# Patient Record
Sex: FEMALE | Race: WHITE | NOT HISPANIC OR LATINO | Employment: FULL TIME | URBAN - NONMETROPOLITAN AREA
[De-identification: names, ages, dates, MRNs, and addresses within clinical notes are randomized per-mention and may not be internally consistent; named-entity substitution may affect disease eponyms.]

---

## 2018-06-18 ENCOUNTER — OFFICE VISIT (OUTPATIENT)
Dept: URGENT CARE | Facility: PHYSICIAN GROUP | Age: 48
End: 2018-06-18
Payer: COMMERCIAL

## 2018-06-18 VITALS
SYSTOLIC BLOOD PRESSURE: 110 MMHG | DIASTOLIC BLOOD PRESSURE: 84 MMHG | HEIGHT: 67 IN | BODY MASS INDEX: 27.62 KG/M2 | TEMPERATURE: 97 F | HEART RATE: 80 BPM | WEIGHT: 176 LBS | RESPIRATION RATE: 14 BRPM | OXYGEN SATURATION: 95 %

## 2018-06-18 DIAGNOSIS — J06.9 URI WITH COUGH AND CONGESTION: ICD-10-CM

## 2018-06-18 DIAGNOSIS — R06.02 SOB (SHORTNESS OF BREATH): ICD-10-CM

## 2018-06-18 DIAGNOSIS — R06.2 WHEEZING: ICD-10-CM

## 2018-06-18 PROCEDURE — 99214 OFFICE O/P EST MOD 30 MIN: CPT | Performed by: PHYSICIAN ASSISTANT

## 2018-06-18 RX ORDER — METHYLPREDNISOLONE 4 MG/1
TABLET ORAL
Qty: 21 TAB | Refills: 0 | Status: SHIPPED | OUTPATIENT
Start: 2018-06-18 | End: 2019-02-25

## 2018-06-18 RX ORDER — DOXYCYCLINE HYCLATE 100 MG
100 TABLET ORAL 2 TIMES DAILY
Qty: 14 TAB | Refills: 0 | Status: SHIPPED | OUTPATIENT
Start: 2018-06-18 | End: 2019-02-25

## 2018-06-19 NOTE — PROGRESS NOTES
"  Chief Complaint   Patient presents with   • Cough     Persistent/Painful cough x2w       HISTORY OF PRESENT ILLNESS: Patient is a 48 y.o. female who presents today for the following:    Cough x 2 weeks  CP, worse with coughing, sneezing, breathing  H/o the same with \"lung infection\"  + mostly dry cough, low grade fever, SOB, ST (but now resolved)  OTC meds tried: Nyquil, mucinex      There are no active problems to display for this patient.      Allergies:Patient has no known allergies.    Current Outpatient Prescriptions Ordered in [facility]   Medication Sig Dispense Refill   • doxycycline (VIBRAMYCIN) 100 MG Tab Take 1 Tab by mouth 2 times a day. 14 Tab 0   • MethylPREDNISolone (MEDROL DOSEPAK) 4 MG Tablet Therapy Pack Use as package directs 21 Tab 0     No current Epic-ordered facility-administered medications on file.        Past Medical History:   Diagnosis Date   • Pneumonia        Social History   Substance Use Topics   • Smoking status: Current Every Day Smoker     Types: Cigarettes     Last attempt to quit: 1/1/2014   • Smokeless tobacco: Never Used   • Alcohol use No       Family Status   Relation Status   • Mother    • Father      Family History   Problem Relation Age of Onset   • Non-contributory Mother    • Non-contributory Father        Review of Systems:   Constitutional ROS: No unexpected change in weight, No weakness, No fatigue  Eye ROS: No recent significant change in vision, No eye pain, redness, discharge  Ear ROS: No drainage, No tinnitus or vertigo, No recent change in hearing  Mouth/Throat ROS: No teeth or gum problems, No bleeding gums, No tongue complaints  Neck ROS: No swollen glands, No significant pain in neck  Cardiovascular ROS: No diaphoresis, No edema, No palpitations  Gastrointestinal ROS: No change in bowel habits, No significant change in appetite, No nausea, vomiting, diarrhea, or constipation  Musculoskeletal/Extremities ROS: No peripheral edema, No pain, redness or swelling on the " "joints  Hematologic/Lymphatic ROS: No chills, No night sweats, No weight loss  Skin/Integumentary ROS: No edema, No evidence of rash, No itching      Exam:  Blood pressure 110/84, pulse 80, temperature 36.1 °C (97 °F), resp. rate 14, height 1.702 m (5' 7\"), weight 79.8 kg (176 lb), SpO2 95 %.  General: Well developed, well nourished. No distress.  Eye: PERRL/EOMI; conjunctivae clear, lids normal.  ENMT: Lips without lesions, MMM. Oropharynx is clear. Bilateral TMs are within normal limits.  Neck: Trachea midline, no masses. No thyromegaly.  Pulmonary: Unlabored respiratory effort. Diffuse fine end expiratory wheezes.  Cardiovascular: Regular rate and rhythm without murmur. No edema.   Neurologic: Grossly nonfocal. No facial asymmetry noted.  Lymph: No cervical lymphadenopathy noted.  Skin: Warm, dry, good turgor. No rashes in visible areas.   Psych: Normal mood. Alert and oriented x3. Judgment and insight is normal.    Assessment/Plan:  Take all medications as directed. Follow up for worsening or persistent symptoms.. Discussed appropriate over-the-counter symptomatic medication, and when to return to clinic.  1. URI with cough and congestion  doxycycline (VIBRAMYCIN) 100 MG Tab   2. Wheezing  MethylPREDNISolone (MEDROL DOSEPAK) 4 MG Tablet Therapy Pack   3. SOB (shortness of breath)  MethylPREDNISolone (MEDROL DOSEPAK) 4 MG Tablet Therapy Pack       "

## 2018-10-17 ENCOUNTER — NON-PROVIDER VISIT (OUTPATIENT)
Dept: URGENT CARE | Facility: PHYSICIAN GROUP | Age: 48
End: 2018-10-17

## 2018-10-17 DIAGNOSIS — Z02.1 PRE-EMPLOYMENT DRUG SCREENING: ICD-10-CM

## 2018-10-17 LAB
AMP AMPHETAMINE: NORMAL
COC COCAINE: NORMAL
INT CON NEG: NORMAL
INT CON POS: NORMAL
MET METHAMPHETAMINES: NORMAL
OPI OPIATES: NORMAL
PCP PHENCYCLIDINE: NORMAL
POC DRUG COMMENT 753798-OCCUPATIONAL HEALTH: NEGATIVE
THC: NORMAL

## 2018-10-17 PROCEDURE — 80305 DRUG TEST PRSMV DIR OPT OBS: CPT | Performed by: NURSE PRACTITIONER

## 2018-12-10 ENCOUNTER — TELEPHONE (OUTPATIENT)
Dept: SCHEDULING | Facility: IMAGING CENTER | Age: 48
End: 2018-12-10

## 2019-01-07 ENCOUNTER — OFFICE VISIT (OUTPATIENT)
Dept: MEDICAL GROUP | Facility: PHYSICIAN GROUP | Age: 49
End: 2019-01-07
Payer: COMMERCIAL

## 2019-01-07 VITALS
TEMPERATURE: 97.6 F | HEIGHT: 67 IN | DIASTOLIC BLOOD PRESSURE: 80 MMHG | HEART RATE: 82 BPM | SYSTOLIC BLOOD PRESSURE: 118 MMHG | BODY MASS INDEX: 28.41 KG/M2 | RESPIRATION RATE: 16 BRPM | WEIGHT: 181 LBS | OXYGEN SATURATION: 99 %

## 2019-01-07 DIAGNOSIS — Z13.6 SCREENING FOR CARDIOVASCULAR CONDITION: ICD-10-CM

## 2019-01-07 DIAGNOSIS — Z12.39 SCREENING FOR BREAST CANCER: ICD-10-CM

## 2019-01-07 DIAGNOSIS — Z13.0 ENCOUNTER FOR SCREENING FOR HEMATOLOGIC DISORDER: ICD-10-CM

## 2019-01-07 DIAGNOSIS — M25.50 ARTHRALGIA, UNSPECIFIED JOINT: ICD-10-CM

## 2019-01-07 DIAGNOSIS — F33.9 RECURRENT MAJOR DEPRESSIVE DISORDER, REMISSION STATUS UNSPECIFIED (HCC): ICD-10-CM

## 2019-01-07 PROBLEM — F17.200 NICOTINE DEPENDENCE WITH CURRENT USE: Status: ACTIVE | Noted: 2019-01-07

## 2019-01-07 PROCEDURE — 99214 OFFICE O/P EST MOD 30 MIN: CPT | Performed by: NURSE PRACTITIONER

## 2019-01-07 RX ORDER — VENLAFAXINE HYDROCHLORIDE 37.5 MG/1
37.5 CAPSULE, EXTENDED RELEASE ORAL DAILY
Qty: 90 CAP | Refills: 1 | Status: SHIPPED | OUTPATIENT
Start: 2019-01-07 | End: 2019-02-25

## 2019-01-07 RX ORDER — SERTRALINE HYDROCHLORIDE 100 MG/1
TABLET, FILM COATED ORAL
Refills: 3 | COMMUNITY
Start: 2018-11-07 | End: 2019-01-07

## 2019-01-07 RX ORDER — NAPROXEN 500 MG/1
500 TABLET ORAL 2 TIMES DAILY WITH MEALS
Qty: 60 TAB | Refills: 1 | Status: SHIPPED | OUTPATIENT
Start: 2019-01-07 | End: 2020-01-15

## 2019-01-07 ASSESSMENT — PATIENT HEALTH QUESTIONNAIRE - PHQ9
CLINICAL INTERPRETATION OF PHQ2 SCORE: 6
5. POOR APPETITE OR OVEREATING: 3 - NEARLY EVERY DAY
SUM OF ALL RESPONSES TO PHQ QUESTIONS 1-9: 18

## 2019-01-08 PROBLEM — M25.50 ARTHRALGIA: Status: ACTIVE | Noted: 2019-01-08

## 2019-01-08 NOTE — ASSESSMENT & PLAN NOTE
This is a chronic condition, stable and previously controlled on sertraline 100 mg daily.  However she has been out of her medications for several months as there was issue with getting set up with a new PCP at her previous medical organization.  She does state that she would like to try a different medication as she did not feel the sertraline was effective anymore.  She is very hesitant to try anything that will cause weight gain and she is adamant about not starting Wellbutrin due to side effects.  She is agreeable to starting the venlafaxine extended release, 37.5 mg daily.  I did discuss with her that there is room to move up if needed.  She does decline a referral to behavioral health, states she has seen therapy in the past and did not find it to be helpful.  She does deny suicidal and homicidal ideations, hallucinations, racing thoughts and flights of ideas.  She will follow-up in 6-8 weeks with labs done before visit.

## 2019-01-08 NOTE — PATIENT INSTRUCTIONS
Mammogram ordered    Labs before you see me in 6-8 weeks    Will start Effexor XR 37.5 mg daily

## 2019-01-08 NOTE — ASSESSMENT & PLAN NOTE
Patient reports that since starting her new job a couple months ago she has had persistent bilateral lower extremity joint pain especially in her feet, reports to being on her feet nearly her entire shift, she is tried several different shoes and nothing has improved her pain.  She has not tried anything over-the-counter, we will have her start naproxen 500 mg twice daily with food.  She is aware of proper exercises for her bilateral foot and ankle pain.  Discussed with her that she may need referral to podiatry if pain does not improve with interventions and anti-inflammatories.

## 2019-01-08 NOTE — PROGRESS NOTES
Chief Complaint   Patient presents with   • Annual Exam     est care, depression         This is a 48 y.o.female patient that presents today with the following: Establish care with new PCP, discuss acute and chronic conditions    Recurrent major depressive disorder (HCC)  This is a chronic condition, stable and previously controlled on sertraline 100 mg daily.  However she has been out of her medications for several months as there was issue with getting set up with a new PCP at her previous medical organization.  She does state that she would like to try a different medication as she did not feel the sertraline was effective anymore.  She is very hesitant to try anything that will cause weight gain and she is adamant about not starting Wellbutrin due to side effects.  She is agreeable to starting the venlafaxine extended release, 37.5 mg daily.  I did discuss with her that there is room to move up if needed.  She does decline a referral to behavioral health, states she has seen therapy in the past and did not find it to be helpful.  She does deny suicidal and homicidal ideations, hallucinations, racing thoughts and flights of ideas.  She will follow-up in 6-8 weeks with labs done before visit.    Arthralgia  Patient reports that since starting her new job a couple months ago she has had persistent bilateral lower extremity joint pain especially in her feet, reports to being on her feet nearly her entire shift, she is tried several different shoes and nothing has improved her pain.  She has not tried anything over-the-counter, we will have her start naproxen 500 mg twice daily with food.  She is aware of proper exercises for her bilateral foot and ankle pain.  Discussed with her that she may need referral to podiatry if pain does not improve with interventions and anti-inflammatories.      No visits with results within 1 Month(s) from this visit.   Latest known visit with results is:   Non-Provider Visit on  10/17/2018   Component Date Value   • POC Urine Drug Screen Co* 10/17/2018 Negative    • Internal Control Positive 10/17/2018 Valid    • Internal Control Negative 10/17/2018 Valid          clinical course has been stable    Past Medical History:   Diagnosis Date   • Allergy    • Anxiety    • Cancer (HCC)    • Depression    • Migraine    • Pneumonia        Past Surgical History:   Procedure Laterality Date   • TONSILLECTOMY AND ADENOIDECTOMY     • TUBAL COAGULATION LAPAROSCOPIC BILATERAL         Family History   Problem Relation Age of Onset   • Non-contributory Mother    • Hypertension Mother    • Non-contributory Father    • Arthritis Father    • Hyperlipidemia Father    • Cancer Maternal Grandmother    • Psychiatry Maternal Grandmother    • Diabetes Paternal Grandfather    • Stroke Maternal Grandfather        Patient has no known allergies.    Current Outpatient Prescriptions Ordered in [facility]   Medication Sig Dispense Refill   • venlafaxine XR (EFFEXOR XR) 37.5 MG CAPSULE SR 24 HR Take 1 Cap by mouth every day. 90 Cap 1   • naproxen (NAPROSYN) 500 MG Tab Take 1 Tab by mouth 2 times a day, with meals. 60 Tab 1   • doxycycline (VIBRAMYCIN) 100 MG Tab Take 1 Tab by mouth 2 times a day. 14 Tab 0   • MethylPREDNISolone (MEDROL DOSEPAK) 4 MG Tablet Therapy Pack Use as package directs 21 Tab 0     No current Epic-ordered facility-administered medications on file.        Constitutional ROS: No unexpected change in weight, No weakness, No unexplained fevers, sweats, or chills  Pulmonary ROS: No chronic cough, sputum, or hemoptysis, No shortness of breath, No recent change in breathing, Positive for smoker   Cardiovascular ROS: No chest pain, No edema, No palpitations  Gastrointestinal ROS: No abdominal pain, No nausea, vomiting, diarrhea, or constipation  Musculoskeletal/Extremities ROS: Positive per HPI  Neurologic ROS: Normal development, No seizures, No weakness  Psychiatric ROS: Positive per HPI    Physical exam:  BP  "118/80 (BP Location: Right arm, Patient Position: Sitting, BP Cuff Size: Adult long)   Pulse 82   Temp 36.4 °C (97.6 °F) (Temporal)   Resp 16   Ht 1.689 m (5' 6.5\")   Wt 82.1 kg (181 lb)   SpO2 99%   BMI 28.78 kg/m²   General Appearance: Middle-aged female, alert, no distress, moderately overweight, well-groomed  Skin: Skin color, texture, turgor normal. No rashes or lesions.  Lungs: negative findings: normal respiratory rate and rhythm, lungs clear to auscultation  Heart: negative. RRR without murmur, gallop, or rubs.  No ectopy.  Abdomen: Abdomen soft, non-tender. BS normal. No masses,  No organomegaly  Musculoskeletal: negative findings: no evidence of joint instability, strength normal, no deformities present  Neurologic: intact, intact, mood appropriate, judgment intact.  CN II through XII grossly intact    Medical decision making/discussion: Patient will follow-up in 6-8 weeks with labs done before visit.  She will start venlafaxine ER 37.5 mg daily, she declines referral to behavioral health.  For her lower extremity joint pain, she will start naproxen 500 mg twice daily with food.  Discussed with her that we may need to consider referral to podiatry if her symptoms not improve with anti-inflammatories, gentle range of motion exercises and stretches.  We will get outside records from [facility] and mammogram has been ordered for screening.    Caro was seen today for annual exam.    Diagnoses and all orders for this visit:    Recurrent major depressive disorder, remission status unspecified (HCC)  -     venlafaxine XR (EFFEXOR XR) 37.5 MG CAPSULE SR 24 HR; Take 1 Cap by mouth every day.  -     TSH WITH REFLEX TO FT4; Future    Arthralgia, unspecified joint  -     naproxen (NAPROSYN) 500 MG Tab; Take 1 Tab by mouth 2 times a day, with meals.    Screening for cardiovascular condition  -     COMP METABOLIC PANEL; Future  -     Lipid Profile; Future    Screening for breast cancer  -  "    MA-SCREEN MAMMO W/CAD-BILAT; Future    Encounter for screening for hematologic disorder  -     CBC WITH DIFFERENTIAL; Future    Other orders  -     Obtain Results: Other (see comment); Obtain Results From: [facility]          Please note that this dictation was created using voice recognition software. I have made every reasonable attempt to correct obvious errors, but I expect that there are errors of grammar and possibly content that I did not discover before finalizing the note.

## 2019-01-26 ENCOUNTER — HOSPITAL ENCOUNTER (OUTPATIENT)
Dept: LAB | Facility: MEDICAL CENTER | Age: 49
End: 2019-01-26
Attending: NURSE PRACTITIONER
Payer: COMMERCIAL

## 2019-01-26 DIAGNOSIS — Z13.6 SCREENING FOR CARDIOVASCULAR CONDITION: ICD-10-CM

## 2019-01-26 DIAGNOSIS — Z13.0 ENCOUNTER FOR SCREENING FOR HEMATOLOGIC DISORDER: ICD-10-CM

## 2019-01-26 DIAGNOSIS — F33.9 RECURRENT MAJOR DEPRESSIVE DISORDER, REMISSION STATUS UNSPECIFIED (HCC): ICD-10-CM

## 2019-01-26 LAB
ALBUMIN SERPL BCP-MCNC: 4.2 G/DL (ref 3.2–4.9)
ALBUMIN/GLOB SERPL: 1.6 G/DL
ALP SERPL-CCNC: 86 U/L (ref 30–99)
ALT SERPL-CCNC: 30 U/L (ref 2–50)
ANION GAP SERPL CALC-SCNC: 7 MMOL/L (ref 0–11.9)
AST SERPL-CCNC: 29 U/L (ref 12–45)
BASOPHILS # BLD AUTO: 0.9 % (ref 0–1.8)
BASOPHILS # BLD: 0.08 K/UL (ref 0–0.12)
BILIRUB SERPL-MCNC: 0.8 MG/DL (ref 0.1–1.5)
BUN SERPL-MCNC: 19 MG/DL (ref 8–22)
CALCIUM SERPL-MCNC: 9.1 MG/DL (ref 8.5–10.5)
CHLORIDE SERPL-SCNC: 109 MMOL/L (ref 96–112)
CHOLEST SERPL-MCNC: 165 MG/DL (ref 100–199)
CO2 SERPL-SCNC: 25 MMOL/L (ref 20–33)
CREAT SERPL-MCNC: 0.9 MG/DL (ref 0.5–1.4)
EOSINOPHIL # BLD AUTO: 0.25 K/UL (ref 0–0.51)
EOSINOPHIL NFR BLD: 2.7 % (ref 0–6.9)
ERYTHROCYTE [DISTWIDTH] IN BLOOD BY AUTOMATED COUNT: 46.7 FL (ref 35.9–50)
GLOBULIN SER CALC-MCNC: 2.7 G/DL (ref 1.9–3.5)
GLUCOSE SERPL-MCNC: 91 MG/DL (ref 65–99)
HCT VFR BLD AUTO: 46.1 % (ref 37–47)
HDLC SERPL-MCNC: 51 MG/DL
HGB BLD-MCNC: 15.5 G/DL (ref 12–16)
IMM GRANULOCYTES # BLD AUTO: 0.02 K/UL (ref 0–0.11)
IMM GRANULOCYTES NFR BLD AUTO: 0.2 % (ref 0–0.9)
LDLC SERPL CALC-MCNC: 101 MG/DL
LYMPHOCYTES # BLD AUTO: 3.01 K/UL (ref 1–4.8)
LYMPHOCYTES NFR BLD: 32.6 % (ref 22–41)
MCH RBC QN AUTO: 32.4 PG (ref 27–33)
MCHC RBC AUTO-ENTMCNC: 33.6 G/DL (ref 33.6–35)
MCV RBC AUTO: 96.4 FL (ref 81.4–97.8)
MONOCYTES # BLD AUTO: 0.71 K/UL (ref 0–0.85)
MONOCYTES NFR BLD AUTO: 7.7 % (ref 0–13.4)
NEUTROPHILS # BLD AUTO: 5.15 K/UL (ref 2–7.15)
NEUTROPHILS NFR BLD: 55.9 % (ref 44–72)
NRBC # BLD AUTO: 0 K/UL
NRBC BLD-RTO: 0 /100 WBC
PLATELET # BLD AUTO: 288 K/UL (ref 164–446)
PMV BLD AUTO: 11 FL (ref 9–12.9)
POTASSIUM SERPL-SCNC: 3.9 MMOL/L (ref 3.6–5.5)
PROT SERPL-MCNC: 6.9 G/DL (ref 6–8.2)
RBC # BLD AUTO: 4.78 M/UL (ref 4.2–5.4)
SODIUM SERPL-SCNC: 141 MMOL/L (ref 135–145)
T4 FREE SERPL-MCNC: 0.67 NG/DL (ref 0.53–1.43)
TRIGL SERPL-MCNC: 65 MG/DL (ref 0–149)
TSH SERPL DL<=0.005 MIU/L-ACNC: 7.73 UIU/ML (ref 0.38–5.33)
WBC # BLD AUTO: 9.2 K/UL (ref 4.8–10.8)

## 2019-01-26 PROCEDURE — 36415 COLL VENOUS BLD VENIPUNCTURE: CPT

## 2019-01-26 PROCEDURE — 84439 ASSAY OF FREE THYROXINE: CPT

## 2019-01-26 PROCEDURE — 80053 COMPREHEN METABOLIC PANEL: CPT

## 2019-01-26 PROCEDURE — 85025 COMPLETE CBC W/AUTO DIFF WBC: CPT

## 2019-01-26 PROCEDURE — 84443 ASSAY THYROID STIM HORMONE: CPT

## 2019-01-26 PROCEDURE — 80061 LIPID PANEL: CPT

## 2019-02-05 ENCOUNTER — TELEPHONE (OUTPATIENT)
Dept: MEDICAL GROUP | Facility: PHYSICIAN GROUP | Age: 49
End: 2019-02-05

## 2019-02-05 NOTE — TELEPHONE ENCOUNTER
----- Message from Caro Harding sent at 2/4/2019  5:00 AM PST -----  Regarding: Lab results  Caro,  I have reviewed your labs, all are good except TSH (thyroid stimulating hormone) was mildly elevated but the actually thyroid hormone level was normal. We will closely watch this. We can discuss more at your follow up appt at the end of February.  Jaleesa

## 2019-02-05 NOTE — TELEPHONE ENCOUNTER
I sent the below info to patient via a [employer] message a week ago--pt still has not read message. Please call pt with message. Thank you.

## 2019-02-06 NOTE — TELEPHONE ENCOUNTER
Phone Number Called: [phone] (home)     Message: Advised patient to review labs on [facility] or call back for results.     Left Message for patient to call back: yes

## 2019-02-25 ENCOUNTER — OFFICE VISIT (OUTPATIENT)
Dept: MEDICAL GROUP | Facility: PHYSICIAN GROUP | Age: 49
End: 2019-02-25
Payer: COMMERCIAL

## 2019-02-25 VITALS
WEIGHT: 179 LBS | SYSTOLIC BLOOD PRESSURE: 122 MMHG | DIASTOLIC BLOOD PRESSURE: 78 MMHG | RESPIRATION RATE: 16 BRPM | HEART RATE: 70 BPM | HEIGHT: 67 IN | OXYGEN SATURATION: 97 % | TEMPERATURE: 97.4 F | BODY MASS INDEX: 28.09 KG/M2

## 2019-02-25 DIAGNOSIS — F33.9 RECURRENT MAJOR DEPRESSIVE DISORDER, REMISSION STATUS UNSPECIFIED (HCC): ICD-10-CM

## 2019-02-25 DIAGNOSIS — R79.89 ABNORMAL TSH: ICD-10-CM

## 2019-02-25 PROCEDURE — 99214 OFFICE O/P EST MOD 30 MIN: CPT | Performed by: NURSE PRACTITIONER

## 2019-02-25 RX ORDER — ESCITALOPRAM OXALATE 10 MG/1
10 TABLET ORAL DAILY
Qty: 90 TAB | Refills: 1 | Status: SHIPPED | OUTPATIENT
Start: 2019-02-25 | End: 2019-03-26 | Stop reason: SDUPTHER

## 2019-02-26 PROBLEM — R79.89 ABNORMAL TSH: Status: ACTIVE | Noted: 2019-02-26

## 2019-02-26 NOTE — ASSESSMENT & PLAN NOTE
Patient had mildly elevated TSH but a normal T4.  She does not have a past history of thyroid disease and she does deny symptoms other than her irritability.  She is going to follow-up in 3 months with additional labs done before visit.

## 2019-02-26 NOTE — ASSESSMENT & PLAN NOTE
This is a chronic condition, stable and usually well controlled on medications.  In early January she establish care and we started her on venlafaxine, she noticed no difference and feels that she is still very angry and irritable and would like to try different medication.  She has been on citalopram in the very distant past and does not remember whether or not it worked as she does not believe she was on it for very long.  She does carry to start this medication this was called in for her.  She is going to follow-up with me in 3 months, sooner if needed.

## 2019-02-26 NOTE — PROGRESS NOTES
Chief Complaint   Patient presents with   • Depression     fv labs         This is a 49 y.o.female patient that presents today with the following: Follow-up visit review labs    Recurrent major depressive disorder (HCC)  This is a chronic condition, stable and usually well controlled on medications.  In early January she establish care and we started her on venlafaxine, she noticed no difference and feels that she is still very angry and irritable and would like to try different medication.  She has been on citalopram in the very distant past and does not remember whether or not it worked as she does not believe she was on it for very long.  She does carry to start this medication this was called in for her.  She is going to follow-up with me in 3 months, sooner if needed.    Abnormal TSH  Patient had mildly elevated TSH but a normal T4.  She does not have a past history of thyroid disease and she does deny symptoms other than her irritability.  She is going to follow-up in 3 months with additional labs done before visit.      Hospital Outpatient Visit on 01/26/2019   Component Date Value   • Sodium 01/26/2019 141    • Potassium 01/26/2019 3.9    • Chloride 01/26/2019 109    • Co2 01/26/2019 25    • Anion Gap 01/26/2019 7.0    • Glucose 01/26/2019 91    • Bun 01/26/2019 19    • Creatinine 01/26/2019 0.90    • Calcium 01/26/2019 9.1    • AST(SGOT) 01/26/2019 29    • ALT(SGPT) 01/26/2019 30    • Alkaline Phosphatase 01/26/2019 86    • Total Bilirubin 01/26/2019 0.8    • Albumin 01/26/2019 4.2    • Total Protein 01/26/2019 6.9    • Globulin 01/26/2019 2.7    • A-G Ratio 01/26/2019 1.6    • Cholesterol,Tot 01/26/2019 165    • Triglycerides 01/26/2019 65    • HDL 01/26/2019 51    • LDL 01/26/2019 101*   • TSH 01/26/2019 7.730*   • WBC 01/26/2019 9.2    • RBC 01/26/2019 4.78    • Hemoglobin 01/26/2019 15.5    • Hematocrit 01/26/2019 46.1    • MCV 01/26/2019 96.4    • MCH 01/26/2019 32.4    • MCHC 01/26/2019 33.6    • RDW  01/26/2019 46.7    • Platelet Count 01/26/2019 288    • MPV 01/26/2019 11.0    • Neutrophils-Polys 01/26/2019 55.90    • Lymphocytes 01/26/2019 32.60    • Monocytes 01/26/2019 7.70    • Eosinophils 01/26/2019 2.70    • Basophils 01/26/2019 0.90    • Immature Granulocytes 01/26/2019 0.20    • Nucleated RBC 01/26/2019 0.00    • Neutrophils (Absolute) 01/26/2019 5.15    • Lymphs (Absolute) 01/26/2019 3.01    • Monos (Absolute) 01/26/2019 0.71    • Eos (Absolute) 01/26/2019 0.25    • Baso (Absolute) 01/26/2019 0.08    • Immature Granulocytes (a* 01/26/2019 0.02    • NRBC (Absolute) 01/26/2019 0.00    • GFR If  01/26/2019 >60    • GFR If Non  [employer]meri* 01/26/2019 >60    • Free T-4 01/26/2019 0.67          clinical course has been stable    Past Medical History:   Diagnosis Date   • Allergy    • Anxiety    • Cancer (HCC)    • Depression    • Migraine    • Pneumonia        Past Surgical History:   Procedure Laterality Date   • TONSILLECTOMY AND ADENOIDECTOMY     • TUBAL COAGULATION LAPAROSCOPIC BILATERAL         Family History   Problem Relation Age of Onset   • Non-contributory Mother    • Hypertension Mother    • Non-contributory Father    • Arthritis Father    • Hyperlipidemia Father    • Cancer Maternal Grandmother    • Psychiatry Maternal Grandmother    • Diabetes Paternal Grandfather    • Stroke Maternal Grandfather        Patient has no known allergies.    Current Outpatient Prescriptions Ordered in [facility]   Medication Sig Dispense Refill   • escitalopram (LEXAPRO) 10 MG Tab Take 1 Tab by mouth every day. 90 Tab 1   • naproxen (NAPROSYN) 500 MG Tab Take 1 Tab by mouth 2 times a day, with meals. 60 Tab 1     No current Epic-ordered facility-administered medications on file.        Constitutional ROS: No unexpected change in weight, No weakness, No unexplained fevers, sweats, or chills  Pulmonary ROS: No chronic cough, sputum, or hemoptysis, No shortness of breath, No recent change in breathing.   "Positive for smoker  Cardiovascular ROS: No chest pain, No edema, No palpitations  Musculoskeletal/Extremities ROS: No clubbing, No peripheral edema, No pain, redness or swelling on the joints  Neurologic ROS: Normal development, No seizures, No weakness  Psychiatric ROS: Positive per HPI  Endocrine ROS: Positive per HPI    Physical exam:  /78 (BP Location: Right arm, Patient Position: Sitting, BP Cuff Size: Adult)   Pulse 70   Temp 36.3 °C (97.4 °F) (Temporal)   Resp 16   Ht 1.689 m (5' 6.5\")   Wt 81.2 kg (179 lb)   SpO2 97%   BMI 28.46 kg/m²   General Appearance: Middle-aged female, alert, no distress, moderately overweight, well-groomed  Skin: Skin color, texture, turgor normal. No rashes or lesions.  Lungs: negative findings: normal respiratory rate and rhythm, normal effort  Musculoskeletal: negative findings: no evidence of joint instability, no evidence of muscle atrophy, no deformities present  Neurologic: intact    Medical decision making/discussion: Patient will stop venlafaxine and start escitalopram as mentioned above.  She is going to follow-up in 3 months with thyroid labs done before visit.    Caro was seen today for depression.    Diagnoses and all orders for this visit:    Recurrent major depressive disorder, remission status unspecified (HCC)  -     escitalopram (LEXAPRO) 10 MG Tab; Take 1 Tab by mouth every day.    Abnormal TSH  -     TSH WITH REFLEX TO FT4; Future  -     TRIIDOTHYRONINE; Future  -     T3 FREE; Future  -     THYROID PEROXIDASE  (TPO) AB; Future  -     ANTITHYROGLOBULIN AB; Future          Please note that this dictation was created using voice recognition software. I have made every reasonable attempt to correct obvious errors, but I expect that there are errors of grammar and possibly content that I did not discover before finalizing the note.        "

## 2019-03-26 DIAGNOSIS — F33.9 RECURRENT MAJOR DEPRESSIVE DISORDER, REMISSION STATUS UNSPECIFIED (HCC): ICD-10-CM

## 2019-03-26 RX ORDER — ESCITALOPRAM OXALATE 20 MG/1
20 TABLET ORAL DAILY
Qty: 90 TAB | Refills: 1 | Status: SHIPPED | OUTPATIENT
Start: 2019-03-26 | End: 2019-07-11

## 2019-07-11 ENCOUNTER — OFFICE VISIT (OUTPATIENT)
Dept: MEDICAL GROUP | Facility: PHYSICIAN GROUP | Age: 49
End: 2019-07-11
Payer: COMMERCIAL

## 2019-07-11 VITALS
BODY MASS INDEX: 28.72 KG/M2 | TEMPERATURE: 98.2 F | RESPIRATION RATE: 16 BRPM | HEART RATE: 78 BPM | OXYGEN SATURATION: 97 % | DIASTOLIC BLOOD PRESSURE: 78 MMHG | WEIGHT: 183 LBS | SYSTOLIC BLOOD PRESSURE: 118 MMHG | HEIGHT: 67 IN

## 2019-07-11 DIAGNOSIS — F33.1 MODERATE EPISODE OF RECURRENT MAJOR DEPRESSIVE DISORDER (HCC): ICD-10-CM

## 2019-07-11 DIAGNOSIS — R79.89 ABNORMAL TSH: ICD-10-CM

## 2019-07-11 DIAGNOSIS — F17.200 NICOTINE DEPENDENCE WITH CURRENT USE: ICD-10-CM

## 2019-07-11 PROCEDURE — 99214 OFFICE O/P EST MOD 30 MIN: CPT | Performed by: NURSE PRACTITIONER

## 2019-07-11 RX ORDER — PAROXETINE HYDROCHLORIDE 20 MG/1
20 TABLET, FILM COATED ORAL DAILY
Qty: 90 TAB | Refills: 1 | Status: SHIPPED | OUTPATIENT
Start: 2019-07-11 | End: 2020-01-07

## 2019-07-11 ASSESSMENT — PAIN SCALES - GENERAL: PAINLEVEL: NO PAIN

## 2019-07-11 NOTE — PROGRESS NOTES
"Chief Complaint   Patient presents with   • Medication Problem     lexapro-labs not complete   • Referral Needed     mammogram         This is a 49 y.o.female patient that presents today with the following: Follow-up visit    Recurrent major depressive disorder (HCC)  This is a chronic condition, stable and suboptimally controlled on current medications.  She has been tried on several different SSRIs none which have improved her symptoms and so she cannot tolerate due to side effects.  She finds herself being very \"mean\", irritable, and as well depressed.  She is agreeable to seeing psychiatry for further evaluation and medication management.  In the interim she would like to try yet another SSRI, she has never been on paroxetine.  She will start full 20 mg daily, she understands the risks, benefits and common adverse effects associated with this.  She is going to follow-up with me in 2 to 3 months, sooner if needed.    Abnormal TSH  Patient had mildly elevated TSH but a normal T4 back in February 2018.  She was due for labs before today's visit but did not have them done.  She will have them done by the end of this week or early next week.  She does deny symptoms associated with hypothyroidism.    Nicotine dependence with current use  Patient continues to smoke on a daily basis, she understands the risks associated with ongoing tobacco use.  She declines assistance with cessation at this time and states she will work on this herself.      No visits with results within 1 Month(s) from this visit.   Latest known visit with results is:   Hospital Outpatient Visit on 01/26/2019   Component Date Value   • Sodium 01/26/2019 141    • Potassium 01/26/2019 3.9    • Chloride 01/26/2019 109    • Co2 01/26/2019 25    • Anion Gap 01/26/2019 7.0    • Glucose 01/26/2019 91    • Bun 01/26/2019 19    • Creatinine 01/26/2019 0.90    • Calcium 01/26/2019 9.1    • AST(SGOT) 01/26/2019 29    • ALT(SGPT) 01/26/2019 30    • Alkaline " Phosphatase 01/26/2019 86    • Total Bilirubin 01/26/2019 0.8    • Albumin 01/26/2019 4.2    • Total Protein 01/26/2019 6.9    • Globulin 01/26/2019 2.7    • A-G Ratio 01/26/2019 1.6    • Cholesterol,Tot 01/26/2019 165    • Triglycerides 01/26/2019 65    • HDL 01/26/2019 51    • LDL 01/26/2019 101*   • TSH 01/26/2019 7.730*   • WBC 01/26/2019 9.2    • RBC 01/26/2019 4.78    • Hemoglobin 01/26/2019 15.5    • Hematocrit 01/26/2019 46.1    • MCV 01/26/2019 96.4    • MCH 01/26/2019 32.4    • MCHC 01/26/2019 33.6    • RDW 01/26/2019 46.7    • Platelet Count 01/26/2019 288    • MPV 01/26/2019 11.0    • Neutrophils-Polys 01/26/2019 55.90    • Lymphocytes 01/26/2019 32.60    • Monocytes 01/26/2019 7.70    • Eosinophils 01/26/2019 2.70    • Basophils 01/26/2019 0.90    • Immature Granulocytes 01/26/2019 0.20    • Nucleated RBC 01/26/2019 0.00    • Neutrophils (Absolute) 01/26/2019 5.15    • Lymphs (Absolute) 01/26/2019 3.01    • Monos (Absolute) 01/26/2019 0.71    • Eos (Absolute) 01/26/2019 0.25    • Baso (Absolute) 01/26/2019 0.08    • Immature Granulocytes (a* 01/26/2019 0.02    • NRBC (Absolute) 01/26/2019 0.00    • GFR If  01/26/2019 >60    • GFR If Non  Ameri* 01/26/2019 >60    • Free T-4 01/26/2019 0.67          clinical course has been stable    Past Medical History:   Diagnosis Date   • Allergy    • Anxiety    • Cancer (HCC)    • Depression    • Migraine    • Pneumonia        Past Surgical History:   Procedure Laterality Date   • TONSILLECTOMY AND ADENOIDECTOMY     • TUBAL COAGULATION LAPAROSCOPIC BILATERAL         Family History   Problem Relation Age of Onset   • Non-contributory Mother    • Hypertension Mother    • Non-contributory Father    • Arthritis Father    • Hyperlipidemia Father    • Cancer Maternal Grandmother    • Psychiatry Maternal Grandmother    • Diabetes Paternal Grandfather    • Stroke Maternal Grandfather        Patient has no known allergies.    Current Outpatient  "Prescriptions Ordered in Epic   Medication Sig Dispense Refill   • PARoxetine (PAXIL) 20 MG Tab Take 1 Tab by mouth every day. 90 Tab 1   • naproxen (NAPROSYN) 500 MG Tab Take 1 Tab by mouth 2 times a day, with meals. 60 Tab 1     No current Epic-ordered facility-administered medications on file.        Constitutional ROS: No unexpected change in weight, No weakness, No unexplained fevers, sweats, or chills  Pulmonary ROS: No chronic cough, sputum, or hemoptysis, No shortness of breath, No recent change in breathing, Positive for smoker   Cardiovascular ROS: No chest pain  Musculoskeletal/Extremities ROS: No clubbing, No peripheral edema, No pain, redness or swelling on the joints  Neurologic ROS: Normal development, No seizures, No weakness  Psychiatric ROS: Positive per HPI    Physical exam:  /78 (BP Location: Right arm, Patient Position: Sitting, BP Cuff Size: Adult)   Pulse 78   Temp 36.8 °C (98.2 °F) (Temporal)   Resp 16   Ht 1.689 m (5' 6.5\")   Wt 83 kg (183 lb)   SpO2 97%   Breastfeeding? No   BMI 29.09 kg/m²   General Appearance: Pleasant middle-aged female, alert, no distress, moderately overweight, well-groomed  Skin: Skin color, texture, turgor normal. No rashes or lesions.  Lungs: negative findings: normal respiratory rate and rhythm, normal effort  Musculoskeletal: negative findings: no evidence of joint instability, strength normal, no deformities present  Neurologic: intact    Medical decision making/discussion: Patient is agreeable to referral to behavioral health for further evaluation medication management and she has been tried on several different SSRIs with no significant success in controlling her symptoms.  In the interim she is going to start paroxetine as mentioned above.  She was reminded to have her labs done as we were following up with an elevated TSH.  She is going to follow-up with me in 2 months, sooner if needed.    Caro was seen today for medication problem and " referral needed.    Diagnoses and all orders for this visit:    Moderate episode of recurrent major depressive disorder (HCC)  -     REFERRAL TO BEHAVIORAL HEALTH  -     PARoxetine (PAXIL) 20 MG Tab; Take 1 Tab by mouth every day.    Abnormal TSH    Nicotine dependence with current use        Return in about 2 months (around 9/11/2019) for Discuss Labs, Follow-up.        Please note that this dictation was created using voice recognition software. I have made every reasonable attempt to correct obvious errors, but I expect that there are errors of grammar and possibly content that I did not discover before finalizing the note.

## 2019-07-11 NOTE — PATIENT INSTRUCTIONS
Referral to     Labs this Saturday    Follow up with me in 2-3 months, sooner if needed    Paroxetine 20 mg daily

## 2019-07-14 NOTE — ASSESSMENT & PLAN NOTE
Patient had mildly elevated TSH but a normal T4 back in February 2018.  She was due for labs before today's visit but did not have them done.  She will have them done by the end of this week or early next week.  She does deny symptoms associated with hypothyroidism.

## 2019-07-14 NOTE — ASSESSMENT & PLAN NOTE
"This is a chronic condition, stable and suboptimally controlled on current medications.  She has been tried on several different SSRIs none which have improved her symptoms and so she cannot tolerate due to side effects.  She finds herself being very \"mean\", irritable, and as well depressed.  She also realizes her symptoms may be associated with perimenopause.  She is agreeable to seeing psychiatry for further evaluation and medication management.  In the interim she would like to try yet another SSRI, she has never been on paroxetine.  She will start full 20 mg daily, she understands the risks, benefits and common adverse effects associated with this.  She is going to follow-up with me in 2 to 3 months, sooner if needed.  "

## 2019-07-14 NOTE — ASSESSMENT & PLAN NOTE
Patient continues to smoke on a daily basis, she understands the risks associated with ongoing tobacco use.  She declines assistance with cessation at this time and states she will work on this herself.

## 2020-01-15 ENCOUNTER — OFFICE VISIT (OUTPATIENT)
Dept: MEDICAL GROUP | Facility: PHYSICIAN GROUP | Age: 50
End: 2020-01-15
Payer: COMMERCIAL

## 2020-01-15 VITALS
RESPIRATION RATE: 16 BRPM | WEIGHT: 176 LBS | OXYGEN SATURATION: 97 % | SYSTOLIC BLOOD PRESSURE: 120 MMHG | HEIGHT: 66 IN | DIASTOLIC BLOOD PRESSURE: 84 MMHG | TEMPERATURE: 97.4 F | BODY MASS INDEX: 28.28 KG/M2 | HEART RATE: 67 BPM

## 2020-01-15 DIAGNOSIS — R06.02 SHORTNESS OF BREATH: ICD-10-CM

## 2020-01-15 DIAGNOSIS — R05.9 COUGH: ICD-10-CM

## 2020-01-15 LAB
FLUAV+FLUBV AG SPEC QL IA: NORMAL
INT CON NEG: NORMAL
INT CON POS: NORMAL

## 2020-01-15 PROCEDURE — 87804 INFLUENZA ASSAY W/OPTIC: CPT | Performed by: NURSE PRACTITIONER

## 2020-01-15 PROCEDURE — 99214 OFFICE O/P EST MOD 30 MIN: CPT | Performed by: NURSE PRACTITIONER

## 2020-01-15 RX ORDER — ALBUTEROL SULFATE 90 UG/1
2 AEROSOL, METERED RESPIRATORY (INHALATION) EVERY 4 HOURS PRN
Qty: 1 INHALER | Refills: 1 | Status: SHIPPED | OUTPATIENT
Start: 2020-01-15 | End: 2020-07-14 | Stop reason: SDUPTHER

## 2020-01-15 RX ORDER — METHYLPREDNISOLONE 4 MG/1
TABLET ORAL
Qty: 21 TAB | Refills: 0 | Status: SHIPPED | OUTPATIENT
Start: 2020-01-15 | End: 2020-07-07

## 2020-01-15 RX ORDER — PROMETHAZINE HYDROCHLORIDE AND CODEINE PHOSPHATE 6.25; 1 MG/5ML; MG/5ML
5-10 SYRUP ORAL 4 TIMES DAILY PRN
Qty: 120 ML | Refills: 0 | Status: SHIPPED | OUTPATIENT
Start: 2020-01-15 | End: 2020-01-25

## 2020-01-15 ASSESSMENT — PATIENT HEALTH QUESTIONNAIRE - PHQ9
SUM OF ALL RESPONSES TO PHQ9 QUESTIONS 1 AND 2: 0
1. LITTLE INTEREST OR PLEASURE IN DOING THINGS: NOT AT ALL
9. THOUGHTS THAT YOU WOULD BE BETTER OFF DEAD, OR OF HURTING YOURSELF: NOT AT ALL
5. POOR APPETITE OR OVEREATING: SEVERAL DAYS
8. MOVING OR SPEAKING SO SLOWLY THAT OTHER PEOPLE COULD HAVE NOTICED. OR THE OPPOSITE, BEING SO FIGETY OR RESTLESS THAT YOU HAVE BEEN MOVING AROUND A LOT MORE THAN USUAL: NOT AT ALL
7. TROUBLE CONCENTRATING ON THINGS, SUCH AS READING THE NEWSPAPER OR WATCHING TELEVISION: SEVERAL DAYS
2. FEELING DOWN, DEPRESSED, IRRITABLE, OR HOPELESS: NOT AT ALL
SUM OF ALL RESPONSES TO PHQ QUESTIONS 1-9: 3
6. FEELING BAD ABOUT YOURSELF - OR THAT YOU ARE A FAILURE OR HAVE LET YOURSELF OR YOUR FAMILY DOWN: NOT AL ALL
4. FEELING TIRED OR HAVING LITTLE ENERGY: SEVERAL DAYS
3. TROUBLE FALLING OR STAYING ASLEEP OR SLEEPING TOO MUCH: NOT AT ALL

## 2020-01-15 NOTE — PROGRESS NOTES
Chief Complaint   Patient presents with   • Shortness of Breath     x  5 days    • Fever         This is a 49 y.o.female patient that presents today with the following: Cough, sore throat, chest congestion, shortness of breath    Cough  Patient reports cough, shortness of breath and fever over the past 5 days.  She has been exposed to several contacts with influenza, point-of-care rapid influenza negative today.  She is afebrile today and denies any other symptoms indicating bacterial infection.  Phlegm that she is coughing up is clear as well as her nasal drainage.  Discussed with her likely viral upper respiratory versus lower respiratory infection, will treat supportively with cough medication, steroid and albuterol inhaler.  We discussed rest, fluids, over-the-counter cough and cold medications but we will go ahead and give her prescription for Medrol Dosepak, cough medication as well as albuterol inhaler.  She was advised not to drive while taking cough medication due to sedating effects.      No visits with results within 1 Month(s) from this visit.   Latest known visit with results is:   Hospital Outpatient Visit on 01/26/2019   Component Date Value   • Sodium 01/26/2019 141    • Potassium 01/26/2019 3.9    • Chloride 01/26/2019 109    • Co2 01/26/2019 25    • Anion Gap 01/26/2019 7.0    • Glucose 01/26/2019 91    • Bun 01/26/2019 19    • Creatinine 01/26/2019 0.90    • Calcium 01/26/2019 9.1    • AST(SGOT) 01/26/2019 29    • ALT(SGPT) 01/26/2019 30    • Alkaline Phosphatase 01/26/2019 86    • Total Bilirubin 01/26/2019 0.8    • Albumin 01/26/2019 4.2    • Total Protein 01/26/2019 6.9    • Globulin 01/26/2019 2.7    • A-G Ratio 01/26/2019 1.6    • Cholesterol,Tot 01/26/2019 165    • Triglycerides 01/26/2019 65    • HDL 01/26/2019 51    • LDL 01/26/2019 101*   • TSH 01/26/2019 7.730*   • WBC 01/26/2019 9.2    • RBC 01/26/2019 4.78    • Hemoglobin 01/26/2019 15.5    • Hematocrit 01/26/2019 46.1    • MCV  01/26/2019 96.4    • MCH 01/26/2019 32.4    • MCHC 01/26/2019 33.6    • RDW 01/26/2019 46.7    • Platelet Count 01/26/2019 288    • MPV 01/26/2019 11.0    • Neutrophils-Polys 01/26/2019 55.90    • Lymphocytes 01/26/2019 32.60    • Monocytes 01/26/2019 7.70    • Eosinophils 01/26/2019 2.70    • Basophils 01/26/2019 0.90    • Immature Granulocytes 01/26/2019 0.20    • Nucleated RBC 01/26/2019 0.00    • Neutrophils (Absolute) 01/26/2019 5.15    • Lymphs (Absolute) 01/26/2019 3.01    • Monos (Absolute) 01/26/2019 0.71    • Eos (Absolute) 01/26/2019 0.25    • Baso (Absolute) 01/26/2019 0.08    • Immature Granulocytes (a* 01/26/2019 0.02    • NRBC (Absolute) 01/26/2019 0.00    • GFR If  01/26/2019 >60    • GFR If Non  Ameri* 01/26/2019 >60    • Free T-4 01/26/2019 0.67          clinical course has been stable    Past Medical History:   Diagnosis Date   • Allergy    • Anxiety    • Cancer (HCC)    • Depression    • Migraine    • Pneumonia        Past Surgical History:   Procedure Laterality Date   • TONSILLECTOMY AND ADENOIDECTOMY     • TUBAL COAGULATION LAPAROSCOPIC BILATERAL         Family History   Problem Relation Age of Onset   • Non-contributory Mother    • Hypertension Mother    • Non-contributory Father    • Arthritis Father    • Hyperlipidemia Father    • Cancer Maternal Grandmother    • Psychiatric Illness Maternal Grandmother    • Diabetes Paternal Grandfather    • Stroke Maternal Grandfather        Patient has no known allergies.    Current Outpatient Medications Ordered in Epic   Medication Sig Dispense Refill   • methylPREDNISolone (MEDROL DOSEPAK) 4 MG Tablet Therapy Pack As directed on the packaging label. 21 Tab 0   • albuterol 108 (90 Base) MCG/ACT Aero Soln inhalation aerosol Inhale 2 Puffs by mouth every four hours as needed for Shortness of Breath. 1 Inhaler 1   • promethazine-codeine (PHENERGAN-CODEINE) 6.25-10 MG/5ML Syrup Take 5-10 mL by mouth 4 times a day as needed for Cough  "for up to 10 days. 120 mL 0   • PARoxetine (PAXIL) 20 MG Tab TAKE 1 TABLET BY MOUTH EVERY DAY 90 Tab 0     No current Epic-ordered facility-administered medications on file.        Constitutional ROS: No unexpected change in weight, No weakness, No unexplained fevers, sweats, or chills  Pulmonary ROS: Positive per HPI  Cardiovascular ROS: No chest pain, No edema, No palpitations  Gastrointestinal ROS: No abdominal pain, No nausea, vomiting, diarrhea, or constipation  Musculoskeletal/Extremities ROS: No clubbing, No peripheral edema, No pain, redness or swelling on the joints  Neurologic ROS: Normal development, No seizures, No weakness    Physical exam:  /84   Pulse 67   Temp 36.3 °C (97.4 °F) (Temporal)   Resp 16   Ht 1.664 m (5' 5.5\")   Wt 79.8 kg (176 lb)   SpO2 97%   BMI 28.84 kg/m²   General Appearance: Middle-aged female, alert, ill-appearing, coughing, moderately overweight, well-groomed  Skin: Skin color, texture, turgor normal. No rashes or lesions.  Lungs: positive findings: Few scattered wheezes throughout posteriorly, positive cough  Heart: negative. RRR without murmur, gallop, or rubs.  No ectopy.  Abdomen: Abdomen soft, non-tender. BS normal. No masses,  No organomegaly  Musculoskeletal: negative findings: no evidence of joint instability, no evidence of muscle atrophy, no deformities present  Neurologic: intact, CN II through XII grossly intact    Medical decision making/discussion:     Medrol Dosepak    Cough medication and albuterol inhaler    She is to follow-up with me if symptoms do not improve over the next 5 to 7 days, if they worsen or she develops any other associated symptoms.    Caro was seen today for shortness of breath and fever.    Diagnoses and all orders for this visit:    Shortness of breath  -     POCT Influenza A/B  -     methylPREDNISolone (MEDROL DOSEPAK) 4 MG Tablet Therapy Pack; As directed on the packaging label.  -     albuterol 108 (90 Base) MCG/ACT Aero " Soln inhalation aerosol; Inhale 2 Puffs by mouth every four hours as needed for Shortness of Breath.  -     promethazine-codeine (PHENERGAN-CODEINE) 6.25-10 MG/5ML Syrup; Take 5-10 mL by mouth 4 times a day as needed for Cough for up to 10 days.    Cough  -     methylPREDNISolone (MEDROL DOSEPAK) 4 MG Tablet Therapy Pack; As directed on the packaging label.  -     albuterol 108 (90 Base) MCG/ACT Aero Soln inhalation aerosol; Inhale 2 Puffs by mouth every four hours as needed for Shortness of Breath.  -     promethazine-codeine (PHENERGAN-CODEINE) 6.25-10 MG/5ML Syrup; Take 5-10 mL by mouth 4 times a day as needed for Cough for up to 10 days.        Return if symptoms worsen or fail to improve, for Follow-up.        Please note that this dictation was created using voice recognition software. I have made every reasonable attempt to correct obvious errors, but I expect that there are errors of grammar and possibly content that I did not discover before finalizing the note.

## 2020-01-16 NOTE — ASSESSMENT & PLAN NOTE
Patient reports cough, shortness of breath and fever over the past 5 days.  She has been exposed to several contacts with influenza, point-of-care rapid influenza negative today.  She is afebrile today and denies any other symptoms indicating bacterial infection.  Phlegm that she is coughing up is clear as well as her nasal drainage.  Discussed with her likely viral upper respiratory versus lower respiratory infection, will treat supportively with cough medication, steroid and albuterol inhaler.  We discussed rest, fluids, over-the-counter cough and cold medications but we will go ahead and give her prescription for Medrol Dosepak, cough medication as well as albuterol inhaler.  She was advised not to drive while taking cough medication due to sedating effects.

## 2020-02-03 ENCOUNTER — PATIENT MESSAGE (OUTPATIENT)
Dept: MEDICAL GROUP | Facility: PHYSICIAN GROUP | Age: 50
End: 2020-02-03

## 2020-02-03 DIAGNOSIS — J40 BRONCHITIS: ICD-10-CM

## 2020-02-03 RX ORDER — DOXYCYCLINE HYCLATE 100 MG
100 TABLET ORAL 2 TIMES DAILY
Qty: 14 TAB | Refills: 0 | Status: SHIPPED | OUTPATIENT
Start: 2020-02-03 | End: 2020-07-07

## 2020-02-03 NOTE — TELEPHONE ENCOUNTER
From: Caro Harding  To: HARPAL George  Sent: 2/3/2020 10:17 AM PST  Subject: Non-Urgent Medical Question    A co worker of mine was just diagnosed with strep throat .i work with her closely. Thought I should let you know.

## 2020-04-08 DIAGNOSIS — F33.1 MODERATE EPISODE OF RECURRENT MAJOR DEPRESSIVE DISORDER (HCC): ICD-10-CM

## 2020-04-08 NOTE — TELEPHONE ENCOUNTER
Was the patient seen in the last year in this department? Yes    Does patient have an active prescription for medications requested? No     Received Request Via: Pharmacy      Pt met protocol?: Yes, OV 1/20

## 2020-04-09 RX ORDER — PAROXETINE HYDROCHLORIDE 20 MG/1
20 TABLET, FILM COATED ORAL
Qty: 90 TAB | Refills: 1 | Status: SHIPPED | OUTPATIENT
Start: 2020-04-09 | End: 2020-08-25 | Stop reason: SDUPTHER

## 2020-07-02 SDOH — ECONOMIC STABILITY: HOUSING INSECURITY
IN THE LAST 12 MONTHS, WAS THERE A TIME WHEN YOU DID NOT HAVE A STEADY PLACE TO SLEEP OR SLEPT IN A SHELTER (INCLUDING NOW)?: NO

## 2020-07-02 SDOH — HEALTH STABILITY: PHYSICAL HEALTH: ON AVERAGE, HOW MANY DAYS PER WEEK DO YOU ENGAGE IN MODERATE TO STRENUOUS EXERCISE (LIKE A BRISK WALK)?: 1 DAY

## 2020-07-02 SDOH — HEALTH STABILITY: MENTAL HEALTH
STRESS IS WHEN SOMEONE FEELS TENSE, NERVOUS, ANXIOUS, OR CAN'T SLEEP AT NIGHT BECAUSE THEIR MIND IS TROUBLED. HOW STRESSED ARE YOU?: TO SOME EXTENT

## 2020-07-02 SDOH — HEALTH STABILITY: PHYSICAL HEALTH: ON AVERAGE, HOW MANY MINUTES DO YOU ENGAGE IN EXERCISE AT THIS LEVEL?: 30 MINUTES

## 2020-07-02 SDOH — ECONOMIC STABILITY: INCOME INSECURITY: IN THE LAST 12 MONTHS, WAS THERE A TIME WHEN YOU WERE NOT ABLE TO PAY THE MORTGAGE OR RENT ON TIME?: NO

## 2020-07-02 SDOH — ECONOMIC STABILITY: TRANSPORTATION INSECURITY
IN THE PAST 12 MONTHS, HAS THE LACK OF TRANSPORTATION KEPT YOU FROM MEDICAL APPOINTMENTS OR FROM GETTING MEDICATIONS?: NO

## 2020-07-02 SDOH — ECONOMIC STABILITY: HOUSING INSECURITY: IN THE LAST 12 MONTHS, HOW MANY PLACES HAVE YOU LIVED?: 1

## 2020-07-02 SDOH — ECONOMIC STABILITY: TRANSPORTATION INSECURITY
IN THE PAST 12 MONTHS, HAS LACK OF RELIABLE TRANSPORTATION KEPT YOU FROM MEDICAL APPOINTMENTS, MEETINGS, WORK OR FROM GETTING THINGS NEEDED FOR DAILY LIVING?: NO

## 2020-07-02 ASSESSMENT — SOCIAL DETERMINANTS OF HEALTH (SDOH)
DO YOU BELONG TO ANY CLUBS OR ORGANIZATIONS SUCH AS CHURCH GROUPS UNIONS, FRATERNAL OR ATHLETIC GROUPS, OR SCHOOL GROUPS?: NO
HOW OFTEN DO YOU HAVE A DRINK CONTAINING ALCOHOL: MONTHLY OR LESS
WITHIN THE PAST 12 MONTHS, THE FOOD YOU BOUGHT JUST DIDN'T LAST AND YOU DIDN'T HAVE MONEY TO GET MORE: NEVER TRUE
HOW MANY DRINKS CONTAINING ALCOHOL DO YOU HAVE ON A TYPICAL DAY WHEN YOU ARE DRINKING: 1 OR 2
HOW OFTEN DO YOU ATTEND CHURCH OR RELIGIOUS SERVICES?: NEVER
WITHIN THE PAST 12 MONTHS, YOU WORRIED THAT YOUR FOOD WOULD RUN OUT BEFORE YOU GOT THE MONEY TO BUY MORE: NEVER TRUE
HOW OFTEN DO YOU ATTENT MEETINGS OF THE CLUB OR ORGANIZATION YOU BELONG TO?: NEVER
HOW OFTEN DO YOU GET TOGETHER WITH FRIENDS OR RELATIVES?: ONCE A WEEK
IN A TYPICAL WEEK, HOW MANY TIMES DO YOU TALK ON THE PHONE WITH FAMILY, FRIENDS, OR NEIGHBORS?: ONCE A WEEK
HOW HARD IS IT FOR YOU TO PAY FOR THE VERY BASICS LIKE FOOD, HOUSING, MEDICAL CARE, AND HEATING?: NOT VERY HARD
HOW OFTEN DO YOU HAVE SIX OR MORE DRINKS ON ONE OCCASION: NEVER

## 2020-07-07 ENCOUNTER — OFFICE VISIT (OUTPATIENT)
Dept: MEDICAL GROUP | Facility: PHYSICIAN GROUP | Age: 50
End: 2020-07-07
Payer: COMMERCIAL

## 2020-07-07 VITALS
OXYGEN SATURATION: 97 % | HEIGHT: 66 IN | DIASTOLIC BLOOD PRESSURE: 80 MMHG | TEMPERATURE: 98.2 F | BODY MASS INDEX: 30.22 KG/M2 | SYSTOLIC BLOOD PRESSURE: 124 MMHG | HEART RATE: 87 BPM | WEIGHT: 188 LBS | RESPIRATION RATE: 16 BRPM

## 2020-07-07 DIAGNOSIS — G89.29 CHRONIC RIGHT SHOULDER PAIN: ICD-10-CM

## 2020-07-07 DIAGNOSIS — Z01.84 ENCOUNTER FOR ANTIBODY RESPONSE EXAMINATION: ICD-10-CM

## 2020-07-07 DIAGNOSIS — R53.83 FATIGUE, UNSPECIFIED TYPE: ICD-10-CM

## 2020-07-07 DIAGNOSIS — Z23 NEED FOR VACCINATION: ICD-10-CM

## 2020-07-07 DIAGNOSIS — Z13.0 ENCOUNTER FOR SCREENING FOR HEMATOLOGIC DISORDER: ICD-10-CM

## 2020-07-07 DIAGNOSIS — Z13.6 SCREENING FOR CARDIOVASCULAR CONDITION: ICD-10-CM

## 2020-07-07 DIAGNOSIS — E66.9 OBESITY (BMI 30-39.9): ICD-10-CM

## 2020-07-07 DIAGNOSIS — R79.89 ABNORMAL TSH: ICD-10-CM

## 2020-07-07 DIAGNOSIS — M25.511 CHRONIC RIGHT SHOULDER PAIN: ICD-10-CM

## 2020-07-07 DIAGNOSIS — Z12.31 ENCOUNTER FOR SCREENING MAMMOGRAM FOR BREAST CANCER: ICD-10-CM

## 2020-07-07 PROCEDURE — 90715 TDAP VACCINE 7 YRS/> IM: CPT | Performed by: NURSE PRACTITIONER

## 2020-07-07 PROCEDURE — 99214 OFFICE O/P EST MOD 30 MIN: CPT | Mod: 25 | Performed by: NURSE PRACTITIONER

## 2020-07-07 PROCEDURE — 90472 IMMUNIZATION ADMIN EACH ADD: CPT | Performed by: NURSE PRACTITIONER

## 2020-07-07 PROCEDURE — 90471 IMMUNIZATION ADMIN: CPT | Performed by: NURSE PRACTITIONER

## 2020-07-07 PROCEDURE — 90732 PPSV23 VACC 2 YRS+ SUBQ/IM: CPT | Performed by: NURSE PRACTITIONER

## 2020-07-07 RX ORDER — NAPROXEN 500 MG/1
500 TABLET ORAL 2 TIMES DAILY WITH MEALS
Qty: 60 TAB | Refills: 0 | Status: SHIPPED | OUTPATIENT
Start: 2020-07-07 | End: 2020-07-13

## 2020-07-07 RX ORDER — TIZANIDINE 4 MG/1
4 TABLET ORAL EVERY 6 HOURS PRN
Qty: 90 TAB | Refills: 0 | Status: SHIPPED | OUTPATIENT
Start: 2020-07-07 | End: 2020-07-13

## 2020-07-07 ASSESSMENT — FIBROSIS 4 INDEX: FIB4 SCORE: 0.88

## 2020-07-07 NOTE — PATIENT INSTRUCTIONS
Referral to sports med    Labs anytime soon, they are fasting    Anti-inflammatory and muscle relaxer    mammogram

## 2020-07-07 NOTE — PROGRESS NOTES
Chief Complaint   Patient presents with   • Shoulder Pain     Right side x 1 year   • Weight Gain         This is a 50 y.o.female patient that presents today with the following: Shoulder pain, weight gain    Chronic right shoulder pain  Has acute of chronic R shoulder pain, injured it a long time ago after shoveling, did have rotator cuff tear, went through PT, had injection  It has worsened over the past 2 weeks, likely related to repetitive motion  Agrees to referral to sports med  Will treat with anti-inflammatory and muscle relaxer in the interim    Obesity (BMI 30-39.9)  Patient concerned regarding weight  Has gained weight over the past several months  Does not feel she has had any change in activity or the way she eats  Weight is 188, BMI 30.81  She does have a past history of abnormal TSH, was to have further work-up but she never had labs done  She will have these done, will be notified of results and further actions if needed  Discussed weight loss strategies    Abnormal TSH  See additional notes, additional labs were previously ordered she will have these done      No visits with results within 1 Month(s) from this visit.   Latest known visit with results is:   Office Visit on 01/15/2020   Component Date Value   • Rapid Influenza A-B 01/15/2020 neg    • Internal Control Positive 01/15/2020 Valid    • Internal Control Negative 01/15/2020 Valid          clinical course has been stable    Past Medical History:   Diagnosis Date   • Allergy    • Anxiety    • Cancer (HCC)    • Depression    • Migraine    • Pneumonia        Past Surgical History:   Procedure Laterality Date   • TONSILLECTOMY AND ADENOIDECTOMY     • TUBAL COAGULATION LAPAROSCOPIC BILATERAL         Family History   Problem Relation Age of Onset   • Non-contributory Mother    • Hypertension Mother    • Non-contributory Father    • Arthritis Father    • Hyperlipidemia Father    • Cancer Maternal Grandmother    • Psychiatric Illness Maternal  "Grandmother    • Diabetes Paternal Grandfather    • Stroke Maternal Grandfather        Patient has no known allergies.    Current Outpatient Medications Ordered in Epic   Medication Sig Dispense Refill   • naproxen (NAPROSYN) 500 MG Tab Take 1 Tab by mouth 2 times a day, with meals. 60 Tab 0   • tizanidine (ZANAFLEX) 4 MG Tab Take 1 Tab by mouth every 6 hours as needed. 90 Tab 0   • PARoxetine (PAXIL) 20 MG Tab Take 1 Tab by mouth every day. 90 Tab 1   • albuterol 108 (90 Base) MCG/ACT Aero Soln inhalation aerosol Inhale 2 Puffs by mouth every four hours as needed for Shortness of Breath. 1 Inhaler 1     No current Epic-ordered facility-administered medications on file.        Constitutional ROS:  No weakness, No unexplained fevers, sweats, or chills.  Positive for weight gain  Pulmonary ROS: No chronic cough, sputum, or hemoptysis, No shortness of breath, No recent change in breathing  Cardiovascular ROS: No chest pain, No edema, No palpitations  Gastrointestinal ROS: No abdominal pain, No nausea, vomiting, diarrhea, or constipation  Musculoskeletal/Extremities ROS: Positive per HPI  Neurologic ROS: Normal development, No seizures, No weakness  Endocrine ROS: Positive per HPI    Physical exam:  /80   Pulse 87   Temp 36.8 °C (98.2 °F) (Temporal)   Resp 16   Ht 1.664 m (5' 5.5\")   Wt 85.3 kg (188 lb)   SpO2 97%   BMI 30.81 kg/m²   General Appearance: Pleasant middle-aged female, alert, no distress, obese, well groomed  Skin: Skin color, texture, turgor normal. No rashes or lesions.  Lungs: negative findings: normal respiratory rate and rhythm, lungs clear to auscultation  Heart: negative. RRR without murmur, gallop, or rubs.  No ectopy.  Abdomen: Abdomen soft, non-tender. BS normal. No masses,  No organomegaly  Extremities: positive findings: Limited range of motion to left upper extremity especially with abduction, tenderness with palpation over AC joint  Musculoskeletal: negative findings: no evidence " of joint instability, no evidence of muscle atrophy, no deformities present  Neurologic: intact, CN II through XII grossly intact    Medical decision making/discussion:     Referral to sports med    Labs anytime soon, they are fasting    Anti-inflammatory and muscle relaxer    mammogram      Caro was seen today for shoulder pain and weight gain.    Diagnoses and all orders for this visit:    Obesity (BMI 30-39.9)  -     Patient identified as having weight management issue.  Appropriate orders and counseling given.  -     TSH; Future  -     FREE THYROXINE; Future  -     T3 FREE; Future  -     Cancel: THYROID PEROX AB TPO (REFLEX); Future  -     ANTITHYROGLOBULIN AB; Future    Chronic right shoulder pain  -     naproxen (NAPROSYN) 500 MG Tab; Take 1 Tab by mouth 2 times a day, with meals.  -     tizanidine (ZANAFLEX) 4 MG Tab; Take 1 Tab by mouth every 6 hours as needed.  -     REFERRAL TO SPORTS MEDICINE    Need for vaccination  -     Pneumovax Vaccine (PPSV23)  -     Tdap Vaccine =>8YO IM    Encounter for screening mammogram for breast cancer  -     MA-SCREENING MAMMO BILAT W/CAD; Future    Encounter for antibody response examination  -     SARS CoV-2 Ab, Total; Standing  -     SARS CoV-2 Ab, Total    Abnormal TSH  -     TSH; Future  -     FREE THYROXINE; Future  -     T3 FREE; Future  -     Cancel: THYROID PEROX AB TPO (REFLEX); Future  -     ANTITHYROGLOBULIN AB; Future    Fatigue, unspecified type  -     TSH; Future  -     FREE THYROXINE; Future  -     T3 FREE; Future  -     Cancel: THYROID PEROX AB TPO (REFLEX); Future  -     ANTITHYROGLOBULIN AB; Future    Screening for cardiovascular condition  -     Comp Metabolic Panel; Future  -     Lipid Profile; Future    Encounter for screening for hematologic disorder  -     CBC WITH DIFFERENTIAL; Future        Return if symptoms worsen or fail to improve, for Follow-up.        Please note that this dictation was created using voice recognition software. I have made  every reasonable attempt to correct obvious errors, but I expect that there are errors of grammar and possibly content that I did not discover before finalizing the note.

## 2020-07-07 NOTE — ASSESSMENT & PLAN NOTE
Has acute of chronic R shoulder pain, injured it a long time ago after shoveling, did have rotator cuff tear, went through PT, had injection  It has worsened over the past 2 weeks, likely related to repetitive motion  Agrees to referral to sports med  Will treat with anti-inflammatory and muscle relaxer in the interim

## 2020-07-08 ENCOUNTER — HOSPITAL ENCOUNTER (OUTPATIENT)
Dept: LAB | Facility: MEDICAL CENTER | Age: 50
End: 2020-07-08
Attending: NURSE PRACTITIONER
Payer: COMMERCIAL

## 2020-07-08 DIAGNOSIS — E66.9 OBESITY (BMI 30-39.9): ICD-10-CM

## 2020-07-08 DIAGNOSIS — R79.89 ABNORMAL TSH: ICD-10-CM

## 2020-07-08 DIAGNOSIS — R53.83 FATIGUE, UNSPECIFIED TYPE: ICD-10-CM

## 2020-07-08 DIAGNOSIS — Z13.0 ENCOUNTER FOR SCREENING FOR HEMATOLOGIC DISORDER: ICD-10-CM

## 2020-07-08 DIAGNOSIS — Z13.6 SCREENING FOR CARDIOVASCULAR CONDITION: ICD-10-CM

## 2020-07-08 LAB
ALBUMIN SERPL BCP-MCNC: 4.3 G/DL (ref 3.2–4.9)
ALBUMIN/GLOB SERPL: 1.7 G/DL
ALP SERPL-CCNC: 100 U/L (ref 30–99)
ALT SERPL-CCNC: 46 U/L (ref 2–50)
ANION GAP SERPL CALC-SCNC: 12 MMOL/L (ref 7–16)
AST SERPL-CCNC: 29 U/L (ref 12–45)
BASOPHILS # BLD AUTO: 0.8 % (ref 0–1.8)
BASOPHILS # BLD: 0.08 K/UL (ref 0–0.12)
BILIRUB SERPL-MCNC: 0.6 MG/DL (ref 0.1–1.5)
BUN SERPL-MCNC: 11 MG/DL (ref 8–22)
CALCIUM SERPL-MCNC: 9.3 MG/DL (ref 8.5–10.5)
CHLORIDE SERPL-SCNC: 104 MMOL/L (ref 96–112)
CHOLEST SERPL-MCNC: 187 MG/DL (ref 100–199)
CO2 SERPL-SCNC: 24 MMOL/L (ref 20–33)
CREAT SERPL-MCNC: 0.92 MG/DL (ref 0.5–1.4)
EOSINOPHIL # BLD AUTO: 0.3 K/UL (ref 0–0.51)
EOSINOPHIL NFR BLD: 2.9 % (ref 0–6.9)
ERYTHROCYTE [DISTWIDTH] IN BLOOD BY AUTOMATED COUNT: 44.2 FL (ref 35.9–50)
FASTING STATUS PATIENT QL REPORTED: NORMAL
GLOBULIN SER CALC-MCNC: 2.5 G/DL (ref 1.9–3.5)
GLUCOSE SERPL-MCNC: 97 MG/DL (ref 65–99)
HCT VFR BLD AUTO: 46.2 % (ref 37–47)
HDLC SERPL-MCNC: 44 MG/DL
HGB BLD-MCNC: 15.5 G/DL (ref 12–16)
IMM GRANULOCYTES # BLD AUTO: 0.03 K/UL (ref 0–0.11)
IMM GRANULOCYTES NFR BLD AUTO: 0.3 % (ref 0–0.9)
LDLC SERPL CALC-MCNC: 113 MG/DL
LYMPHOCYTES # BLD AUTO: 3.29 K/UL (ref 1–4.8)
LYMPHOCYTES NFR BLD: 31.9 % (ref 22–41)
MCH RBC QN AUTO: 31.8 PG (ref 27–33)
MCHC RBC AUTO-ENTMCNC: 33.5 G/DL (ref 33.6–35)
MCV RBC AUTO: 94.9 FL (ref 81.4–97.8)
MONOCYTES # BLD AUTO: 0.72 K/UL (ref 0–0.85)
MONOCYTES NFR BLD AUTO: 7 % (ref 0–13.4)
NEUTROPHILS # BLD AUTO: 5.89 K/UL (ref 2–7.15)
NEUTROPHILS NFR BLD: 57.1 % (ref 44–72)
NRBC # BLD AUTO: 0 K/UL
NRBC BLD-RTO: 0 /100 WBC
PLATELET # BLD AUTO: 299 K/UL (ref 164–446)
PMV BLD AUTO: 10.7 FL (ref 9–12.9)
POTASSIUM SERPL-SCNC: 4.3 MMOL/L (ref 3.6–5.5)
PROT SERPL-MCNC: 6.8 G/DL (ref 6–8.2)
RBC # BLD AUTO: 4.87 M/UL (ref 4.2–5.4)
SODIUM SERPL-SCNC: 140 MMOL/L (ref 135–145)
T3FREE SERPL-MCNC: 2.5 PG/ML (ref 2–4.4)
T4 FREE SERPL-MCNC: 0.81 NG/DL (ref 0.93–1.7)
TRIGL SERPL-MCNC: 148 MG/DL (ref 0–149)
TSH SERPL DL<=0.005 MIU/L-ACNC: 11.6 UIU/ML (ref 0.38–5.33)
WBC # BLD AUTO: 10.3 K/UL (ref 4.8–10.8)

## 2020-07-08 PROCEDURE — 85025 COMPLETE CBC W/AUTO DIFF WBC: CPT

## 2020-07-08 PROCEDURE — 84481 FREE ASSAY (FT-3): CPT

## 2020-07-08 PROCEDURE — 36415 COLL VENOUS BLD VENIPUNCTURE: CPT

## 2020-07-08 PROCEDURE — 80061 LIPID PANEL: CPT

## 2020-07-08 PROCEDURE — 84443 ASSAY THYROID STIM HORMONE: CPT

## 2020-07-08 PROCEDURE — 86800 THYROGLOBULIN ANTIBODY: CPT

## 2020-07-08 PROCEDURE — 84439 ASSAY OF FREE THYROXINE: CPT

## 2020-07-08 PROCEDURE — 80053 COMPREHEN METABOLIC PANEL: CPT

## 2020-07-08 NOTE — ASSESSMENT & PLAN NOTE
Patient concerned regarding weight  Has gained weight over the past several months  Does not feel she has had any change in activity or the way she eats  Weight is 188, BMI 30.81  She does have a past history of abnormal TSH, was to have further work-up but she never had labs done  She will have these done, will be notified of results and further actions if needed  Discussed weight loss strategies

## 2020-07-10 ENCOUNTER — APPOINTMENT (OUTPATIENT)
Dept: RADIOLOGY | Facility: IMAGING CENTER | Age: 50
End: 2020-07-10
Attending: FAMILY MEDICINE
Payer: COMMERCIAL

## 2020-07-10 ENCOUNTER — OFFICE VISIT (OUTPATIENT)
Dept: MEDICAL GROUP | Facility: CLINIC | Age: 50
End: 2020-07-10
Payer: COMMERCIAL

## 2020-07-10 VITALS
SYSTOLIC BLOOD PRESSURE: 106 MMHG | OXYGEN SATURATION: 97 % | RESPIRATION RATE: 14 BRPM | HEART RATE: 76 BPM | HEIGHT: 66 IN | BODY MASS INDEX: 30.22 KG/M2 | TEMPERATURE: 97.7 F | DIASTOLIC BLOOD PRESSURE: 60 MMHG | WEIGHT: 188 LBS

## 2020-07-10 DIAGNOSIS — M25.511 CHRONIC RIGHT SHOULDER PAIN: ICD-10-CM

## 2020-07-10 DIAGNOSIS — G89.29 CHRONIC RIGHT SHOULDER PAIN: ICD-10-CM

## 2020-07-10 DIAGNOSIS — M25.611 DECREASED RANGE OF MOTION OF RIGHT SHOULDER: ICD-10-CM

## 2020-07-10 LAB — THYROGLOB AB SERPL-ACNC: <0.9 IU/ML (ref 0–4)

## 2020-07-10 PROCEDURE — 99214 OFFICE O/P EST MOD 30 MIN: CPT | Performed by: FAMILY MEDICINE

## 2020-07-10 PROCEDURE — 73030 X-RAY EXAM OF SHOULDER: CPT | Mod: TC,RT | Performed by: FAMILY MEDICINE

## 2020-07-10 ASSESSMENT — ENCOUNTER SYMPTOMS
SHORTNESS OF BREATH: 0
VOMITING: 0
FEVER: 0

## 2020-07-10 ASSESSMENT — FIBROSIS 4 INDEX: FIB4 SCORE: 0.69

## 2020-07-10 NOTE — PROGRESS NOTES
Subjective:     Caro Harding is a 50 y.o. female who presents for Shoulder Pain (Right shoulder pain.)    HPI  Pt presents for evaluation of right shoulder pain   Pain has been present chronically   Pain has been worse lately   Pt has been more active lately which is exacerbating the pain   Pain along the lateral shoulder mainly   Having difficulties with range of motion   Unable to raise her elbow above the level of the shoulder  Pain does wake her up in the middle of the night  No repeat falls or injuries in the last few weeks  No radiating pain   No numbness or tingling   No neck pain   Left shoulder occasionally achy, but not too bad   Has hx of a remote injury in 2004     Review of Systems   Constitutional: Negative for fever.   Respiratory: Negative for shortness of breath.    Cardiovascular: Negative for chest pain.   Gastrointestinal: Negative for vomiting.   Skin: Negative for rash.     PMH:  has a past medical history of Allergy, Anxiety, Cancer (HCC), Depression, Migraine, and Pneumonia. She also has no past medical history of Allergy, unspecified not elsewhere classified or Unspecified asthma(493.90).  MEDS:   Current Outpatient Medications:   •  PARoxetine (PAXIL) 20 MG Tab, Take 1 Tab by mouth every day., Disp: 90 Tab, Rfl: 1  •  naproxen (NAPROSYN) 500 MG Tab, Take 1 Tab by mouth 2 times a day, with meals., Disp: 60 Tab, Rfl: 0  •  tizanidine (ZANAFLEX) 4 MG Tab, Take 1 Tab by mouth every 6 hours as needed., Disp: 90 Tab, Rfl: 0  •  albuterol 108 (90 Base) MCG/ACT Aero Soln inhalation aerosol, Inhale 2 Puffs by mouth every four hours as needed for Shortness of Breath., Disp: 1 Inhaler, Rfl: 1  ALLERGIES: No Known Allergies  SURGHX:   Past Surgical History:   Procedure Laterality Date   • TONSILLECTOMY AND ADENOIDECTOMY     • TUBAL COAGULATION LAPAROSCOPIC BILATERAL       SOCHX:  reports that she has been smoking cigarettes. She has a 25.00 pack-year smoking history. She has never used smokeless  "tobacco. She reports current drug use. Drug: Marijuana. She reports that she does not drink alcohol.  FH: Family history was reviewed, not contributing to chronic shoulder problem     Objective:   /60 (BP Location: Left arm, Patient Position: Sitting, BP Cuff Size: Adult)   Pulse 76   Temp 36.5 °C (97.7 °F) (Temporal)   Resp 14   Ht 1.664 m (5' 5.5\")   Wt 85.3 kg (188 lb)   SpO2 97%   BMI 30.81 kg/m²     Physical Exam  Constitutional:       General: She is not in acute distress.     Appearance: She is well-developed. She is not diaphoretic.   HENT:      Head: Normocephalic and atraumatic.   Pulmonary:      Effort: Pulmonary effort is normal.   Musculoskeletal:      Comments: Right shoulder  General: no gross deformity  ROM: flex 90 active/135 passive, ER 50 (contralateral side 70), IR for vertebral height deficit compared to contralateral side  Palpation: TTP along superior trapezius  Strength: 4/5 abduction, 4/5 ER, 5/5 IR  Rotator Cuff: Empty can +, External rotation lag -  Impingement: Neer’s +, Ashraf +  Biceps: Speed’s -  AC Joint: Cross body +  Stability: Apprehension -  Neuro: Sensation equal and intact bilaterally  Pulses: radial, ulnar 2+   Skin:     General: Skin is warm and dry.      Findings: No erythema.   Neurological:      Mental Status: She is alert and oriented to person, place, and time.   Psychiatric:         Behavior: Behavior normal.         Thought Content: Thought content normal.         Judgment: Judgment normal.         Assessment/Plan:   Assessment    1. Chronic right shoulder pain  - DX-SHOULDER 2+ RIGHT; Future  - MR-SHOULDER-W/O RIGHT; Future    2. Decreased range of motion of right shoulder  - MR-SHOULDER-W/O RIGHT; Future    Patient with chronic right shoulder pain after remote injury about 16 years ago.  Patient has been more active lately and shoulder bothering her more recently.  On exam, her range of motion is poor and high concern for a large rotator cuff tear.  " X-rays did not show significant degeneration compared to what would be expected for age.  Will pursue MRI to further evaluate rotator cuff and make decision if patient is a better surgical candidate or nonoperative candidate to help her shoulder pain long-term.

## 2020-07-13 ENCOUNTER — OFFICE VISIT (OUTPATIENT)
Dept: MEDICAL GROUP | Facility: PHYSICIAN GROUP | Age: 50
End: 2020-07-13
Payer: COMMERCIAL

## 2020-07-13 VITALS
WEIGHT: 186 LBS | BODY MASS INDEX: 29.89 KG/M2 | DIASTOLIC BLOOD PRESSURE: 78 MMHG | TEMPERATURE: 97.8 F | SYSTOLIC BLOOD PRESSURE: 114 MMHG | RESPIRATION RATE: 14 BRPM | HEIGHT: 66 IN | OXYGEN SATURATION: 97 % | HEART RATE: 56 BPM

## 2020-07-13 DIAGNOSIS — E03.8 SUBCLINICAL HYPOTHYROIDISM: ICD-10-CM

## 2020-07-13 DIAGNOSIS — M25.511 CHRONIC RIGHT SHOULDER PAIN: ICD-10-CM

## 2020-07-13 DIAGNOSIS — G89.29 CHRONIC RIGHT SHOULDER PAIN: ICD-10-CM

## 2020-07-13 PROCEDURE — 99214 OFFICE O/P EST MOD 30 MIN: CPT | Performed by: NURSE PRACTITIONER

## 2020-07-13 RX ORDER — LEVOTHYROXINE SODIUM 0.05 MG/1
50 TABLET ORAL
Qty: 90 TAB | Refills: 3 | Status: SHIPPED | OUTPATIENT
Start: 2020-07-13 | End: 2021-11-14 | Stop reason: SDUPTHER

## 2020-07-13 ASSESSMENT — FIBROSIS 4 INDEX: FIB4 SCORE: 0.69

## 2020-07-13 NOTE — PROGRESS NOTES
Chief Complaint   Patient presents with   • Lab Results         This is a 50 y.o.female patient that presents today with the following: Follow-up, review labs    Chronic right shoulder pain  Patient was seen last week for right shoulder pain and referred to sports medicine  Has already been seen by sports medicine has MRI scheduled  She will also be starting physical therapy    Subclinical hypothyroidism  Patient here for follow-up of labs  Had previously abnormal TSH, this was repeated and found to have gone up to 11.6 with a suppressed T4 level  She does continue to have symptoms of hypothyroidism especially feeling sluggish, difficulty with losing weight  We will have her start levothyroxine 50 mcg daily  Discussed with her the risks, benefits and side effects of medication  We will reevaluate TSH in 8 weeks  She was advised to make sure she takes this on an empty stomach first thing in the morning apart from other medications      Hospital Outpatient Visit on 07/08/2020   Component Date Value   • Sodium 07/08/2020 140    • Potassium 07/08/2020 4.3    • Chloride 07/08/2020 104    • Co2 07/08/2020 24    • Anion Gap 07/08/2020 12.0    • Glucose 07/08/2020 97    • Bun 07/08/2020 11    • Creatinine 07/08/2020 0.92    • Calcium 07/08/2020 9.3    • AST(SGOT) 07/08/2020 29    • ALT(SGPT) 07/08/2020 46    • Alkaline Phosphatase 07/08/2020 100*   • Total Bilirubin 07/08/2020 0.6    • Albumin 07/08/2020 4.3    • Total Protein 07/08/2020 6.8    • Globulin 07/08/2020 2.5    • A-G Ratio 07/08/2020 1.7    • WBC 07/08/2020 10.3    • RBC 07/08/2020 4.87    • Hemoglobin 07/08/2020 15.5    • Hematocrit 07/08/2020 46.2    • MCV 07/08/2020 94.9    • MCH 07/08/2020 31.8    • MCHC 07/08/2020 33.5*   • RDW 07/08/2020 44.2    • Platelet Count 07/08/2020 299    • MPV 07/08/2020 10.7    • Neutrophils-Polys 07/08/2020 57.10    • Lymphocytes 07/08/2020 31.90    • Monocytes 07/08/2020 7.00    • Eosinophils 07/08/2020 2.90    • Basophils  07/08/2020 0.80    • Immature Granulocytes 07/08/2020 0.30    • Nucleated RBC 07/08/2020 0.00    • Neutrophils (Absolute) 07/08/2020 5.89    • Lymphs (Absolute) 07/08/2020 3.29    • Monos (Absolute) 07/08/2020 0.72    • Eos (Absolute) 07/08/2020 0.30    • Baso (Absolute) 07/08/2020 0.08    • Immature Granulocytes (a* 07/08/2020 0.03    • NRBC (Absolute) 07/08/2020 0.00    • Cholesterol,Tot 07/08/2020 187    • Triglycerides 07/08/2020 148    • HDL 07/08/2020 44    • LDL 07/08/2020 113*   • TSH 07/08/2020 11.600*   • Free T-4 07/08/2020 0.81*   • T3,Free 07/08/2020 2.50    • Anti-Thyroglobulin 07/08/2020 <0.9    • Fasting Status 07/08/2020 Fasting    • GFR If  07/08/2020 >60    • GFR If Non  Ameri* 07/08/2020 >60          clinical course has been stable    Past Medical History:   Diagnosis Date   • Allergy    • Anxiety    • Cancer (HCC)    • Depression    • Migraine    • Pneumonia        Past Surgical History:   Procedure Laterality Date   • TONSILLECTOMY AND ADENOIDECTOMY     • TUBAL COAGULATION LAPAROSCOPIC BILATERAL         Family History   Problem Relation Age of Onset   • Non-contributory Mother    • Hypertension Mother    • Non-contributory Father    • Arthritis Father    • Hyperlipidemia Father    • Cancer Maternal Grandmother    • Psychiatric Illness Maternal Grandmother    • Diabetes Paternal Grandfather    • Stroke Maternal Grandfather        Patient has no known allergies.    Current Outpatient Medications Ordered in Epic   Medication Sig Dispense Refill   • levothyroxine (SYNTHROID) 50 MCG Tab Take 1 Tab by mouth Every morning on an empty stomach. 90 Tab 3   • PARoxetine (PAXIL) 20 MG Tab Take 1 Tab by mouth every day. 90 Tab 1   • albuterol 108 (90 Base) MCG/ACT Aero Soln inhalation aerosol Inhale 2 Puffs by mouth every four hours as needed for Shortness of Breath. 1 Inhaler 1     No current Epic-ordered facility-administered medications on file.        Constitutional ROS: No  "unexpected change in weight, No weakness, No unexplained fevers, sweats, or chills  Pulmonary ROS: No chronic cough, sputum, or hemoptysis, No shortness of breath, No recent change in breathing, Positive for smoker   Cardiovascular ROS: No chest pain, No edema, No palpitations  Gastrointestinal ROS: No abdominal pain, No nausea, vomiting, diarrhea, or constipation  Musculoskeletal/Extremities ROS: No clubbing, No peripheral edema, No pain, redness or swelling on the joints. Positive for R shoulder pain  Neurologic ROS: Normal development, No seizures, No weakness  Endocrine ROS: positive per HPI    Physical exam:  /78   Pulse (!) 56   Temp 36.6 °C (97.8 °F) (Temporal)   Resp 14   Ht 1.664 m (5' 5.5\")   Wt 84.4 kg (186 lb)   SpO2 97%   BMI 30.48 kg/m²   General Appearance: very pleasant middle aged female, alert, no distress, well groomed  Skin: Skin color, texture, turgor normal. No rashes or lesions.  Neck: negative findings: no asymmetry, masses, or scars, no adenopathy, trachea midline and normal to palpitation, thyroid normal to palpation  Lungs: negative findings: normal respiratory rate and rhythm, lungs clear to auscultation  Heart: negative. RRR without murmur, gallop, or rubs.  No ectopy.  Abdomen: Abdomen soft, non-tender. BS normal. No masses,  No organomegaly  Musculoskeletal: negative findings: no evidence of joint instability, no evidence of muscle atrophy, no deformities present  Neurologic: intact, CN 2-12 grossly intact    Medical decision making/discussion:     Continue  Care per Sports Med    Will have you start levothyroxine 50 mcg daily on empty stomach apart from other daily meds    Thyroid US    Repeat labs in 8 weeks    Follow up with me in 3 months    Caro was seen today for lab results.    Diagnoses and all orders for this visit:    Subclinical hypothyroidism  -     levothyroxine (SYNTHROID) 50 MCG Tab; Take 1 Tab by mouth Every morning on an empty stomach.  -     TSH WITH " REFLEX TO FT4; Future  -     Cancel: US-SOFT TISSUES OF HEAD - NECK; Future  -     US-THYROID; Future    Chronic right shoulder pain        Return in about 3 months (around 10/13/2020) for Discuss Labs, Follow-up.        Please note that this dictation was created using voice recognition software. I have made every reasonable attempt to correct obvious errors, but I expect that there are errors of grammar and possibly content that I did not discover before finalizing the note.

## 2020-07-13 NOTE — ASSESSMENT & PLAN NOTE
Patient here for follow-up of labs  Had previously abnormal TSH, this was repeated and found to have gone up to 11.6 with a suppressed T4 level  She does continue to have symptoms of hypothyroidism especially feeling sluggish, difficulty with losing weight  We will have her start levothyroxine 50 mcg daily  Discussed with her the risks, benefits and side effects of medication  We will reevaluate TSH in 8 weeks  She was advised to make sure she takes this on an empty stomach first thing in the morning apart from other medications

## 2020-07-13 NOTE — ASSESSMENT & PLAN NOTE
Patient was seen last week for right shoulder pain and referred to sports medicine  Has already been seen by sports medicine has MRI scheduled  She will also be starting physical therapy

## 2020-07-14 DIAGNOSIS — R05.9 COUGH: ICD-10-CM

## 2020-07-14 DIAGNOSIS — R06.02 SHORTNESS OF BREATH: ICD-10-CM

## 2020-07-15 RX ORDER — ALBUTEROL SULFATE 90 UG/1
2 AEROSOL, METERED RESPIRATORY (INHALATION) EVERY 4 HOURS PRN
Qty: 1 INHALER | Refills: 1 | Status: SHIPPED | OUTPATIENT
Start: 2020-07-15 | End: 2020-10-19

## 2020-07-23 ENCOUNTER — HOSPITAL ENCOUNTER (OUTPATIENT)
Dept: RADIOLOGY | Facility: MEDICAL CENTER | Age: 50
End: 2020-07-23
Attending: NURSE PRACTITIONER
Payer: COMMERCIAL

## 2020-07-23 DIAGNOSIS — E03.8 SUBCLINICAL HYPOTHYROIDISM: ICD-10-CM

## 2020-07-23 PROCEDURE — 76536 US EXAM OF HEAD AND NECK: CPT

## 2020-07-24 ENCOUNTER — TELEPHONE (OUTPATIENT)
Dept: MEDICAL GROUP | Facility: CLINIC | Age: 50
End: 2020-07-24

## 2020-07-24 NOTE — TELEPHONE ENCOUNTER
----- Message from HARPAL George sent at 7/23/2020  8:59 AM PDT -----  Regarding: FW: Test Result Question  Contact: [phone]  Can we find out why the lab cancelled this, I did not cancel it.  ----- Message -----  From: Caro Hall, Med Ass't  Sent: 7/23/2020   8:49 AM PDT  To: HARPAL George  Subject: FW: Test Result Question                         I see the test was cancelled in the chart please advise what you would like to happen   ----- Message -----  From: Caro Harding  Sent: 7/23/2020   8:42 AM PDT  To: Garry Downey  Subject: Test Result Question                             Good morning   What was the result of the covid antibody test? I do not see it in my test results. Thank you

## 2020-07-24 NOTE — TELEPHONE ENCOUNTER
Called the lab they state the wrong test was ordered a serum test was ordered and never collected not a nasal swap for COVID     Please advise what you would like to have done for this pt now

## 2020-07-26 DIAGNOSIS — Z01.84 ENCOUNTER FOR ANTIBODY RESPONSE EXAMINATION: ICD-10-CM

## 2020-07-26 NOTE — TELEPHONE ENCOUNTER
I specifically ordered a serum test for covid anti-bodies, please let pt know lab discontinued the test mistakenly, I have reordered it, please apologize to her, I still am not understanding why they cancelled it

## 2020-07-27 ENCOUNTER — HOSPITAL ENCOUNTER (OUTPATIENT)
Dept: RADIOLOGY | Facility: MEDICAL CENTER | Age: 50
End: 2020-07-27
Attending: NURSE PRACTITIONER
Payer: COMMERCIAL

## 2020-07-27 DIAGNOSIS — Z12.31 VISIT FOR SCREENING MAMMOGRAM: ICD-10-CM

## 2020-07-27 PROCEDURE — 77067 SCR MAMMO BI INCL CAD: CPT

## 2020-07-30 DIAGNOSIS — R92.30 DENSE BREASTS: ICD-10-CM

## 2020-07-30 DIAGNOSIS — R92.2 DENSE BREASTS: ICD-10-CM

## 2020-07-31 ENCOUNTER — HOSPITAL ENCOUNTER (OUTPATIENT)
Dept: RADIOLOGY | Facility: MEDICAL CENTER | Age: 50
End: 2020-07-31
Attending: NURSE PRACTITIONER
Payer: COMMERCIAL

## 2020-07-31 DIAGNOSIS — R92.2 DENSE BREASTS: ICD-10-CM

## 2020-07-31 DIAGNOSIS — R92.30 DENSE BREASTS: ICD-10-CM

## 2020-07-31 PROCEDURE — 76641 ULTRASOUND BREAST COMPLETE: CPT

## 2020-08-04 ENCOUNTER — HOSPITAL ENCOUNTER (OUTPATIENT)
Dept: LAB | Facility: MEDICAL CENTER | Age: 50
End: 2020-08-04
Attending: NURSE PRACTITIONER
Payer: COMMERCIAL

## 2020-08-04 DIAGNOSIS — Z01.84 ENCOUNTER FOR ANTIBODY RESPONSE EXAMINATION: ICD-10-CM

## 2020-08-04 LAB — SARS-COV-2 AB SERPL QL IA: NORMAL

## 2020-08-04 PROCEDURE — 86769 SARS-COV-2 COVID-19 ANTIBODY: CPT

## 2020-08-04 PROCEDURE — 36415 COLL VENOUS BLD VENIPUNCTURE: CPT

## 2020-08-25 DIAGNOSIS — F33.1 MODERATE EPISODE OF RECURRENT MAJOR DEPRESSIVE DISORDER (HCC): ICD-10-CM

## 2020-08-25 RX ORDER — PAROXETINE HYDROCHLORIDE 40 MG/1
40 TABLET, FILM COATED ORAL
Qty: 90 TAB | Refills: 3 | Status: SHIPPED | OUTPATIENT
Start: 2020-08-25 | End: 2021-11-14 | Stop reason: SDUPTHER

## 2020-10-06 DIAGNOSIS — F33.1 MODERATE EPISODE OF RECURRENT MAJOR DEPRESSIVE DISORDER (HCC): ICD-10-CM

## 2020-10-09 RX ORDER — PAROXETINE HYDROCHLORIDE 20 MG/1
TABLET, FILM COATED ORAL
Qty: 90 TAB | Refills: 1 | OUTPATIENT
Start: 2020-10-09

## 2020-10-14 ENCOUNTER — HOSPITAL ENCOUNTER (OUTPATIENT)
Dept: LAB | Facility: MEDICAL CENTER | Age: 50
End: 2020-10-14
Attending: NURSE PRACTITIONER
Payer: COMMERCIAL

## 2020-10-14 DIAGNOSIS — E03.8 SUBCLINICAL HYPOTHYROIDISM: ICD-10-CM

## 2020-10-14 LAB — TSH SERPL DL<=0.005 MIU/L-ACNC: 2.79 UIU/ML (ref 0.38–5.33)

## 2020-10-14 PROCEDURE — 36415 COLL VENOUS BLD VENIPUNCTURE: CPT

## 2020-10-14 PROCEDURE — 84443 ASSAY THYROID STIM HORMONE: CPT

## 2020-10-19 ENCOUNTER — OFFICE VISIT (OUTPATIENT)
Dept: MEDICAL GROUP | Facility: PHYSICIAN GROUP | Age: 50
End: 2020-10-19
Payer: COMMERCIAL

## 2020-10-19 VITALS
OXYGEN SATURATION: 96 % | HEART RATE: 79 BPM | DIASTOLIC BLOOD PRESSURE: 84 MMHG | WEIGHT: 190 LBS | SYSTOLIC BLOOD PRESSURE: 118 MMHG | BODY MASS INDEX: 30.53 KG/M2 | HEIGHT: 66 IN | RESPIRATION RATE: 14 BRPM | TEMPERATURE: 98 F

## 2020-10-19 DIAGNOSIS — K59.01 SLOW TRANSIT CONSTIPATION: ICD-10-CM

## 2020-10-19 DIAGNOSIS — E03.8 SUBCLINICAL HYPOTHYROIDISM: ICD-10-CM

## 2020-10-19 DIAGNOSIS — E66.9 OBESITY (BMI 30-39.9): ICD-10-CM

## 2020-10-19 PROBLEM — E03.9 ACQUIRED HYPOTHYROIDISM: Status: ACTIVE | Noted: 2019-02-26

## 2020-10-19 PROCEDURE — 99214 OFFICE O/P EST MOD 30 MIN: CPT | Performed by: NURSE PRACTITIONER

## 2020-10-19 ASSESSMENT — FIBROSIS 4 INDEX: FIB4 SCORE: 0.69

## 2020-10-19 NOTE — PATIENT INSTRUCTIONS
Try Miralax 1 capful 1-3 times a day until regular, then daily as needed    Follow up as needed  Constipation, Adult  Constipation is when a person has fewer bowel movements in a week than normal, has difficulty having a bowel movement, or has stools that are dry, hard, or larger than normal. Constipation may be caused by an underlying condition. It may become worse with age if a person takes certain medicines and does not take in enough fluids.  Follow these instructions at home:  Eating and drinking    · Eat foods that have a lot of fiber, such as fresh fruits and vegetables, whole grains, and beans.  · Limit foods that are high in fat, low in fiber, or overly processed, such as french fries, hamburgers, cookies, candies, and soda.  · Drink enough fluid to keep your urine clear or pale yellow.  General instructions  · Exercise regularly or as told by your health care provider.  · Go to the restroom when you have the urge to go. Do not hold it in.  · Take over-the-counter and prescription medicines only as told by your health care provider. These include any fiber supplements.  · Practice pelvic floor retraining exercises, such as deep breathing while relaxing the lower abdomen and pelvic floor relaxation during bowel movements.  · Watch your condition for any changes.  · Keep all follow-up visits as told by your health care provider. This is important.  Contact a health care provider if:  · You have pain that gets worse.  · You have a fever.  · You do not have a bowel movement after 4 days.  · You vomit.  · You are not hungry.  · You lose weight.  · You are bleeding from the anus.  · You have thin, pencil-like stools.  Get help right away if:  · You have a fever and your symptoms suddenly get worse.  · You leak stool or have blood in your stool.  · Your abdomen is bloated.  · You have severe pain in your abdomen.  · You feel dizzy or you faint.  This information is not intended to replace advice given to you by  your health care provider. Make sure you discuss any questions you have with your health care provider.  Document Released: 09/15/2005 Document Revised: 11/30/2018 Document Reviewed: 06/07/2017  [employer] Patient Education © 2020 [employer]      MyPlate from USDA    MyPlate is an outline of a general healthy diet based on the 2010 Dietary Guidelines for Americans, from the U.S. Department of Agriculture (USDA). It sets guidelines for how much food you should eat from each food group based on your age, sex, and level of physical activity.  What are tips for following MyPlate?  To follow MyPlate recommendations:  · Eat a wide variety of fruits and vegetables, grains, and protein foods.  · Serve smaller portions and eat less food throughout the day.  · Limit portion sizes to avoid overeating.  · Enjoy your food.  · Get at least 150 minutes of exercise every week. This is about 30 minutes each day, 5 or more days per week.  It can be difficult to have every meal look like MyPlate. Think about MyPlate as eating guidelines for an entire day, rather than each individual meal.  Fruits and vegetables  · Make half of your plate fruits and vegetables.  · Eat many different colors of fruits and vegetables each day.  · For a 2,000 calorie daily food plan, eat:  ? 2½ cups of vegetables every day.  ? 2 cups of fruit every day.  · 1 cup is equal to:  ? 1 cup raw or cooked vegetables.  ? 1 cup raw fruit.  ? 1 medium-sized orange, apple, or banana.  ? 1 cup 100% fruit or vegetable juice.  ? 2 cups raw leafy greens, such as lettuce, spinach, or kale.  ? ½ cup dried fruit.  Grains  · One fourth of your plate should be grains.  · Make at least half of the grains you eat each day whole grains.  · For a 2,000 calorie daily food plan, eat 6 oz of grains every day.  · 1 oz is equal to:  ? 1 slice bread.  ? 1 cup cereal.  ? ½ cup cooked rice, cereal, or pasta.  Protein  · One fourth of your plate should be protein.  · Eat a wide variety of  protein foods, including meat, poultry, fish, eggs, beans, nuts, and tofu.  · For a 2,000 calorie daily food plan, eat 5½ oz of protein every day.  · 1 oz is equal to:  ? 1 oz meat, poultry, or fish.  ? ¼ cup cooked beans.  ? 1 egg.  ? ½ oz nuts or seeds.  ? 1 Tbsp peanut butter.  Dairy  · Drink fat-free or low-fat (1%) milk.  · Eat or drink dairy as a side to meals.  · For a 2,000 calorie daily food plan, eat or drink 3 cups of dairy every day.  · 1 cup is equal to:  ? 1 cup milk, yogurt, cottage cheese, or soy milk (soy beverage).  ? 2 oz processed cheese.  ? 1½ oz natural cheese.  Fats, oils, salt, and sugars  · Only small amounts of oils are recommended.  · Avoid foods that are high in calories and low in nutritional value (empty calories), like foods high in fat or added sugars.  · Choose foods that are low in salt (sodium). Choose foods that have less than 140 milligrams (mg) of sodium per serving.  · Drink water instead of sugary drinks. Drink enough water each day to keep your urine pale yellow.  Where to find support  · Work with your health care provider or a nutrition specialist (dietitian) to develop a customized eating plan that is right for you.  · Download an lobo (mobile application) to help you track your daily food intake.  Where to find more information  · Go to ChooseMyPlate.gov for more information.  · Learn more and log your daily food intake according to MyPlate using USDA's SuperTracker: www.supertracker.usda.gov  Summary  · MyPlate is a general guideline for healthy eating from the USDA. It is based on the 2010 Dietary Guidelines for Americans.  · In general, fruits and vegetables should take up ½ of your plate, grains should take up ¼ of your plate, and protein should take up ¼ of your plate.  This information is not intended to replace advice given to you by your health care provider. Make sure you discuss any questions you have with your health care provider.  Document Released: 01/06/2009  Document Revised: 01/19/2019 Document Reviewed: 03/19/2018  [employer] Patient Education © 2020 [employer]

## 2020-10-19 NOTE — ASSESSMENT & PLAN NOTE
Patient has been suffering from symptoms of constipation  States that she will have bowel movement once or twice a week and is difficult to pass  She feels that she drinks plenty of water, eats an adequate amount of fiber and exercises regularly  She does not report any other associated symptoms  We will have her trial MiraLAX 1 capful 1-3 times a day until regular and then daily as needed  Patient given printed education material on constipation

## 2020-10-19 NOTE — PROGRESS NOTES
Chief Complaint   Patient presents with   • Hypothyroidism         This is a 50 y.o.female patient that presents today with the following: Review labs    Slow transit constipation  Patient has been suffering from symptoms of constipation  States that she will have bowel movement once or twice a week and is difficult to pass  She feels that she drinks plenty of water, eats an adequate amount of fiber and exercises regularly  She does not report any other associated symptoms  We will have her trial MiraLAX 1 capful 1-3 times a day until regular and then daily as needed  Patient given printed education material on constipation    Subclinical hypothyroidism  This is a chronic condition, stable and now well controlled on current dose of levothyroxine  On 50 mcg daily, understands to take this on empty stomach first thing in the morning apart from other medications  Denies symptoms of hypothyroidism  Does not need refills at this time    Obesity (BMI 30-39.9)  Patient continues to struggle with her weight  She thought that she was seen improvement once her TSH and T4 level came into normal range  We did discuss at length weight loss strategies, she was given printed education material from [email or web address]      Hospital Outpatient Visit on 10/14/2020   Component Date Value   • TSH 10/14/2020 2.790          clinical course has been stable    Past Medical History:   Diagnosis Date   • Allergy    • Anxiety    • Cancer (HCC)    • Depression    • Migraine    • Pneumonia        Past Surgical History:   Procedure Laterality Date   • TONSILLECTOMY AND ADENOIDECTOMY     • TUBAL COAGULATION LAPAROSCOPIC BILATERAL         Family History   Problem Relation Age of Onset   • Non-contributory Mother    • Hypertension Mother    • Non-contributory Father    • Arthritis Father    • Hyperlipidemia Father    • Cancer Maternal Grandmother    • Psychiatric Illness Maternal Grandmother    • Diabetes Paternal Grandfather    • Stroke Maternal  "Grandfather        Patient has no known allergies.    Current Outpatient Medications Ordered in Epic   Medication Sig Dispense Refill   • PARoxetine (PAXIL) 40 MG tablet Take 1 Tab by mouth every day. 90 Tab 3   • levothyroxine (SYNTHROID) 50 MCG Tab Take 1 Tab by mouth Every morning on an empty stomach. 90 Tab 3     No current Epic-ordered facility-administered medications on file.        Constitutional ROS: No unexpected change in weight, No weakness, No unexplained fevers, sweats, or chills  Pulmonary ROS: No chronic cough, sputum, or hemoptysis, No shortness of breath, No recent change in breathing  Cardiovascular ROS: No chest pain  Gastrointestinal ROS: Positive for constipation  Musculoskeletal/Extremities ROS: No clubbing, No peripheral edema, No pain, redness or swelling on the joints  Neurologic ROS: Normal development, No seizures, No weakness  Endocrine ROS: Positive per HPI    Physical exam:  /84   Pulse 79   Temp 36.7 °C (98 °F) (Temporal)   Resp 14   Ht 1.664 m (5' 5.5\")   Wt 86.2 kg (190 lb)   SpO2 96%   BMI 31.14 kg/m²   General Appearance: Very pleasant middle-aged female, alert, no distress, obese, well-groomed  Skin: Skin color, texture, turgor normal. No rashes or lesions.  Lungs: negative findings: normal respiratory rate and rhythm, lungs clear to auscultation  Heart: negative. RRR without murmur, gallop, or rubs.  No ectopy.  Abdomen: Abdomen soft, non-tender. BS normal. No masses,  No organomegaly  Musculoskeletal: negative findings: no evidence of joint instability, no evidence of muscle atrophy, no deformities present  Neurologic: intact, CN II through XII grossly intact    Medical decision making/discussion:     Try Miralax 1 capful 1-3 times a day until regular, then daily as needed    Follow up as needed    Caro was seen today for hypothyroidism.    Diagnoses and all orders for this visit:    Subclinical hypothyroidism    Slow transit constipation    Obesity (BMI " 30-39.9)        Return if symptoms worsen or fail to improve.        Please note that this dictation was created using voice recognition software. I have made every reasonable attempt to correct obvious errors, but I expect that there are errors of grammar and possibly content that I did not discover before finalizing the note.

## 2020-10-19 NOTE — ASSESSMENT & PLAN NOTE
Patient continues to struggle with her weight  She thought that she was seen improvement once her TSH and T4 level came into normal range  We did discuss at length weight loss strategies, she was given printed education material from [email or web address]

## 2020-10-19 NOTE — ASSESSMENT & PLAN NOTE
This is a chronic condition, stable and now well controlled on current dose of levothyroxine  On 50 mcg daily, understands to take this on empty stomach first thing in the morning apart from other medications  Denies symptoms of hypothyroidism  Does not need refills at this time

## 2021-03-11 DIAGNOSIS — E03.8 SUBCLINICAL HYPOTHYROIDISM: ICD-10-CM

## 2021-04-21 ENCOUNTER — TELEPHONE (OUTPATIENT)
Dept: ENDOCRINOLOGY | Facility: MEDICAL CENTER | Age: 51
End: 2021-04-21

## 2021-04-21 NOTE — TELEPHONE ENCOUNTER
VOICEMAIL  1. Caller Name: Caro Harding                        Call Back Number: [phone] (home)       2. Message: Pt LVM on 04/12/21 to schedule NP lobo.    3. Patient approves office to leave a detailed voicemail/MyChart message: yes    I called patient and LVM to let her know I rec'd her msg and to please call us back to ari that lobo. I provided patient with our phone number.

## 2021-10-20 ENCOUNTER — OFFICE VISIT (OUTPATIENT)
Dept: MEDICAL GROUP | Facility: PHYSICIAN GROUP | Age: 51
End: 2021-10-20
Payer: COMMERCIAL

## 2021-10-20 VITALS
SYSTOLIC BLOOD PRESSURE: 118 MMHG | OXYGEN SATURATION: 98 % | HEIGHT: 67 IN | HEART RATE: 78 BPM | WEIGHT: 181.6 LBS | TEMPERATURE: 96.6 F | DIASTOLIC BLOOD PRESSURE: 76 MMHG | BODY MASS INDEX: 28.5 KG/M2

## 2021-10-20 DIAGNOSIS — Z12.31 VISIT FOR SCREENING MAMMOGRAM: ICD-10-CM

## 2021-10-20 DIAGNOSIS — F41.1 GAD (GENERALIZED ANXIETY DISORDER): ICD-10-CM

## 2021-10-20 DIAGNOSIS — F17.210 TOBACCO DEPENDENCE DUE TO CIGARETTES: ICD-10-CM

## 2021-10-20 DIAGNOSIS — E03.8 SUBCLINICAL HYPOTHYROIDISM: ICD-10-CM

## 2021-10-20 DIAGNOSIS — Z13.21 ENCOUNTER FOR VITAMIN DEFICIENCY SCREENING: ICD-10-CM

## 2021-10-20 DIAGNOSIS — Z13.6 SCREENING FOR CARDIOVASCULAR CONDITION: ICD-10-CM

## 2021-10-20 DIAGNOSIS — Z76.89 ENCOUNTER TO ESTABLISH CARE: ICD-10-CM

## 2021-10-20 DIAGNOSIS — Z23 NEED FOR VACCINATION: ICD-10-CM

## 2021-10-20 DIAGNOSIS — F33.1 MODERATE EPISODE OF RECURRENT MAJOR DEPRESSIVE DISORDER (HCC): ICD-10-CM

## 2021-10-20 PROBLEM — E03.9 ACQUIRED HYPOTHYROIDISM: Status: RESOLVED | Noted: 2019-02-26 | Resolved: 2021-10-20

## 2021-10-20 PROBLEM — K59.01 SLOW TRANSIT CONSTIPATION: Status: RESOLVED | Noted: 2020-10-19 | Resolved: 2021-10-20

## 2021-10-20 PROBLEM — R06.02 SHORTNESS OF BREATH: Status: RESOLVED | Noted: 2020-01-15 | Resolved: 2021-10-20

## 2021-10-20 PROBLEM — E66.9 OBESITY (BMI 30-39.9): Status: RESOLVED | Noted: 2020-07-07 | Resolved: 2021-10-20

## 2021-10-20 PROCEDURE — 99214 OFFICE O/P EST MOD 30 MIN: CPT | Mod: 25 | Performed by: NURSE PRACTITIONER

## 2021-10-20 PROCEDURE — 90686 IIV4 VACC NO PRSV 0.5 ML IM: CPT | Performed by: NURSE PRACTITIONER

## 2021-10-20 PROCEDURE — 90471 IMMUNIZATION ADMIN: CPT | Performed by: NURSE PRACTITIONER

## 2021-10-20 ASSESSMENT — FIBROSIS 4 INDEX: FIB4 SCORE: 0.7

## 2021-10-20 ASSESSMENT — ANXIETY QUESTIONNAIRES
GAD7 TOTAL SCORE: 17
4. TROUBLE RELAXING: MORE THAN HALF THE DAYS
7. FEELING AFRAID AS IF SOMETHING AWFUL MIGHT HAPPEN: SEVERAL DAYS
2. NOT BEING ABLE TO STOP OR CONTROL WORRYING: NEARLY EVERY DAY
1. FEELING NERVOUS, ANXIOUS, OR ON EDGE: NEARLY EVERY DAY
IF YOU CHECKED OFF ANY PROBLEMS ON THIS QUESTIONNAIRE, HOW DIFFICULT HAVE THESE PROBLEMS MADE IT FOR YOU TO DO YOUR WORK, TAKE CARE OF THINGS AT HOME, OR GET ALONG WITH OTHER PEOPLE: VERY DIFFICULT
3. WORRYING TOO MUCH ABOUT DIFFERENT THINGS: NEARLY EVERY DAY
5. BEING SO RESTLESS THAT IT IS HARD TO SIT STILL: MORE THAN HALF THE DAYS
6. BECOMING EASILY ANNOYED OR IRRITABLE: NEARLY EVERY DAY

## 2021-10-20 ASSESSMENT — PATIENT HEALTH QUESTIONNAIRE - PHQ9: CLINICAL INTERPRETATION OF PHQ2 SCORE: 0

## 2021-10-20 NOTE — PROGRESS NOTES
Chief Complaint   Patient presents with   • Establish Care     Prior Marko       Subjective:     HPI:   Caro Harding is a 51 y.o. female here to discuss the evaluation and management of:      Recurrent major depressive disorder (HCC)  Depression Screen (PHQ-2/PHQ-9) 1/7/2019 1/15/2020 10/20/2021   PHQ-2 Total Score - 0 -   PHQ-2 Total Score 6 - 0   PHQ-9 Total Score - 3 -   PHQ-9 Total Score 18 - -       Interpretation of PHQ-9 Total Score   Score Severity   1-4 No Depression   5-9 Mild Depression   10-14 Moderate Depression   15-19 Moderately Severe Depression   20-27 Severe Depression    This is a chronic well-controlled condition.  Patient reports that she has been on 40 mg of Paxil for several years she has been tolerating it well.  She denies any side effects this medication.  She does report that she has a hard time remembering to take her medications daily.  She denies any suicidal ideations today while in clinic.  She denies need refill at this time.    She does report she has been on multiple antidepressants in the past and Paxil has worked the best for her.  We will continue to monitor future appointments.  Last does was yesterday AM. Does not remmember to take it everyday.     Subclinical hypothyroidism  This is a chronic and ongoing health condition.  Patient is due for labs.  Last lab work indicated that she was well controlled on her current levothyroxine dose of 50 mcg daily.  She does report today that she does not take her medications daily or first thing in the morning on an empty stomach.  Discussed with her the importance of taking this medication daily at as well as first thing in the morning on empty stomach.  Patient reports that she will try to work on it.  She indicates today that she has not taken it in approximately 1 week.  She does report symptoms of hypothyroid including lack of interest in doing things, fatigue, and moodiness.    Lab orders were placed today and patient was  encouraged to follow-up in 2 weeks.  Patient denies any refills at this time of levothyroxine.    Tobacco dependence due to cigarettes  This is a chronic and ongoing health condition.  Patient reports that she smokes about a pack a day for the last 30 years.  She does understand the risks associated with smoking cigarettes.  She denies any assistance with smoking sensation at this time.  Did discuss risks of smoking and smoking cessation for approximately 4 minutes during our exam today.  She does report that she does want to quit in the upcoming years but not today.    VANESSA (generalized anxiety disorder)  VANESSA 7 10/20/2021   VANESSA-7 Total Score 17       Interpretation of VANESSA 7 Total Score   Score Severity:  0-4 No Anxiety   5-9 Mild Anxiety  10-14 Moderate Anxiety  15-21 Severe Anxiety    This is a new condition.  Patient is positive for severe anxiety with use of VANESSA 7 screening tool.  Patient is currently taking Paxil however she does not take it on a daily basis.  Encourage patient to take Paxil daily and we will reevaluate this at her next appointment.  Patient was agreeable with this plan of care.  We will continue to monitor future appointments.          ROS:  Gen: no fevers/chills, no changes in weight  Pulm: no sob, no cough  CV: no chest pain, no palpitations  GI: no nausea/vomiting, no diarrhea  MSk: no myalgias  Neuro: no headaches, no numbness/tingling  Psych: Positive per HPI    No Known Allergies    Current medicines (including changes today)  Current Outpatient Medications   Medication Sig Dispense Refill   • PARoxetine (PAXIL) 40 MG tablet Take 1 Tab by mouth every day. 90 Tab 3   • levothyroxine (SYNTHROID) 50 MCG Tab Take 1 Tab by mouth Every morning on an empty stomach. 90 Tab 3     No current facility-administered medications for this visit.       Social History     Tobacco Use   • Smoking status: Current Every Day Smoker     Packs/day: 1.00     Years: 30.00     Pack years: 30.00     Types:  "Cigarettes     Last attempt to quit: 2014     Years since quittin.8   • Smokeless tobacco: Never Used   Vaping Use   • Vaping Use: Never used   Substance Use Topics   • Alcohol use: No   • Drug use: Yes     Types: Marijuana       Patient Active Problem List    Diagnosis Date Noted   • VANESSA (generalized anxiety disorder) 10/20/2021   • Subclinical hypothyroidism 2020   • Chronic right shoulder pain 2020   • Cough 01/15/2020   • Arthralgia 2019   • Tobacco dependence due to cigarettes 2019   • Recurrent major depressive disorder (HCC) 2019       Family History   Problem Relation Age of Onset   • Hypertension Mother    • Arthritis Father    • Hyperlipidemia Father    • Hypertension Father    • Cancer Maternal Grandmother    • Psychiatric Illness Maternal Grandmother    • Diabetes Paternal Grandfather    • Heart Attack Paternal Grandfather    • Hyperlipidemia Paternal Grandfather    • Heart Disease Paternal Grandfather    • Stroke Maternal Grandfather    • Dementia Paternal Grandmother    • Cancer Paternal Grandmother           Objective:     No visits with results within 1 Month(s) from this visit.   Latest known visit with results is:   Hospital Outpatient Visit on 10/14/2020   Component Date Value Ref Range Status   • TSH 10/14/2020 2.790  0.380 - 5.330 uIU/mL Final    Comment: Please note new reference ranges effective 2017 10:00 AM  Pregnant Females, 1st Trimester  0.050-3.700  Pregnant Females, 2nd Trimester  0.310-4.350  Pregnant Females, 3rd Trimester  0.410-5.180         /76 (BP Location: Left arm, Patient Position: Sitting)   Pulse 78   Temp 35.9 °C (96.6 °F) (Temporal)   Ht 1.702 m (5' 7\")   Wt 82.4 kg (181 lb 9.6 oz)   SpO2 98%  Body mass index is 28.44 kg/m².    Physical Exam:  Constitutional: Well-developed and well-nourished female in NAD. Not diaphoretic. No distress.   Skin: warm, dry, intact, no evidence of rash or concerning lesions  Head: " Atraumatic without lesions.  Eyes: Conjunctivae and extraocular motions are normal. Pupils are equal, round, and reactive to light. No scleral icterus.   Ears:  External ears unremarkable. TMs normal; bilaterally  Mouth/Throat: Tongue normal. Oropharynx is clear and moist. Posterior pharynx without erythema or exudates.  Neck: Supple, trachea midline. No thyromegaly present. No cervical or supraclavicular lymphadenopathy.  Cardiovascular: Regular rate and rhythm without murmur. Radial pulses are intact and equal bilaterally.  Pulmonary: Clear to ausculation. Normal effort. No rales, ronchi, or wheezing.   Extremities: No cyanosis, clubbing, erythema, nor edema.   Neurological: Alert and oriented x 3. No cranial nerve deficit. 5/5 myotomes. Sensation intact.   Psychiatric:  Behavior, mood, and affect are appropriate.    Assessment and Plan:     The following treatment plan was discussed:  Reviewed previous primary care provider notes and labs.  Discussed labs with patient and answered all questions.    1. Encounter to establish care  Patient is due for labs.  Labs have been ordered as indicated below.  Patient is due for mammogram screening, order was placed today.  Patient is due for influenza vaccine.  Patient is agreeable and orders placed for influenza vaccine.  Discussed need for Covid vaccine however patient declined and indicated she did not get it.  Patient is due for Pap, encouraged to schedule.    2. Tobacco dependence due to cigarettes  Ready to quit: No  Counseling given: Yes  This is a chronic health condition.  In-depth conversation with patient about risks associated with smoking for approximately 4 minutes during exam today.  Patient Carol today that she does not want any help with quitting smoking today and will work on it in the future.    3. Moderate episode of recurrent major depressive disorder (HCC)  Patient is feeling well on current medications.  Will continue.  Denies any suicidal or  homicidal ideation.  Emphasized importance of healthy diet and exercise.  Discussed that should the patient have any symptoms they should call suicide prevention hotline or report to the emergency room immediately.    4. Subclinical hypothyroidism  Continue thyroid medication.  Instructed patient to take on empty stomach with glass of water, 30 minutes prior to food or other medications.  Labs as indicated.  - TSH WITH REFLEX TO FT4; Future    5. Screening for cardiovascular condition  - Comp Metabolic Panel; Future  - Lipid Profile; Future  - HEMOGLOBIN A1C; Future  - CBC WITH DIFFERENTIAL; Future    6. VANESSA (generalized anxiety disorder)    7. Encounter for vitamin deficiency screening  - VITAMIN D,25 HYDROXY; Future  - VITAMIN B12; Future    8. Need for vaccination  - INFLUENZA VACCINE QUAD INJ (PF)    9. Visit for screening mammogram  - MA-SCREENING MAMMO BILAT W/TOMOSYNTHESIS W/CAD; Future      Any change or worsening of signs or symptoms, patient encouraged to follow-up or report to emergency room for further evaluation. Patient verbalizes understanding and agrees.    Follow-Up: Return in about 12 days (around 11/1/2021) for Follow up labs.      PLEASE NOTE: This dictation was created using voice recognition software. I have made every reasonable attempt to correct obvious errors, but I expect that there are errors of grammar and possibly content that I did not discover before finalizing the note.

## 2021-10-20 NOTE — ASSESSMENT & PLAN NOTE
Depression Screen (PHQ-2/PHQ-9) 1/7/2019 1/15/2020 10/20/2021   PHQ-2 Total Score - 0 -   PHQ-2 Total Score 6 - 0   PHQ-9 Total Score - 3 -   PHQ-9 Total Score 18 - -       Interpretation of PHQ-9 Total Score   Score Severity   1-4 No Depression   5-9 Mild Depression   10-14 Moderate Depression   15-19 Moderately Severe Depression   20-27 Severe Depression    This is a chronic well-controlled condition.  Patient reports that she has been on 40 mg of Paxil for several years she has been tolerating it well.  She denies any side effects this medication.  She does report that she has a hard time remembering to take her medications daily.  She denies any suicidal ideations today while in clinic.  She denies need refill at this time.    She does report she has been on multiple antidepressants in the past and Paxil has worked the best for her.  We will continue to monitor future appointments.  Last does was yesterday AM. Does not remmember to take it everyday.

## 2021-10-20 NOTE — ASSESSMENT & PLAN NOTE
This is a chronic and ongoing health condition.  Patient reports that she smokes about a pack a day for the last 30 years.  She does understand the risks associated with smoking cigarettes.  She denies any assistance with smoking sensation at this time.  Did discuss risks of smoking and smoking cessation for approximately 4 minutes during our exam today.  She does report that she does want to quit in the upcoming years but not today.

## 2021-10-20 NOTE — ASSESSMENT & PLAN NOTE
VANESSA 7 10/20/2021   VANESSA-7 Total Score 17       Interpretation of VANESSA 7 Total Score   Score Severity:  0-4 No Anxiety   5-9 Mild Anxiety  10-14 Moderate Anxiety  15-21 Severe Anxiety    This is a new condition.  Patient is positive for severe anxiety with use of VANESSA 7 screening tool.  Patient is currently taking Paxil however she does not take it on a daily basis.  Encourage patient to take Paxil daily and we will reevaluate this at her next appointment.  Patient was agreeable with this plan of care.  We will continue to monitor future appointments.

## 2021-10-20 NOTE — ASSESSMENT & PLAN NOTE
This is a chronic and ongoing health condition.  Patient is due for labs.  Last lab work indicated that she was well controlled on her current levothyroxine dose of 50 mcg daily.  She does report today that she does not take her medications daily or first thing in the morning on an empty stomach.  Discussed with her the importance of taking this medication daily at as well as first thing in the morning on empty stomach.  Patient reports that she will try to work on it.  She indicates today that she has not taken it in approximately 1 week.  She does report symptoms of hypothyroid including lack of interest in doing things, fatigue, and moodiness.    Lab orders were placed today and patient was encouraged to follow-up in 2 weeks.  Patient denies any refills at this time of levothyroxine.

## 2021-10-20 NOTE — PATIENT INSTRUCTIONS
MyPlate from USDA    MyPlate is an outline of a general healthy diet based on the 2010 Dietary Guidelines for Americans, from the U.S. Department of Agriculture (USDA). It sets guidelines for how much food you should eat from each food group based on your age, sex, and level of physical activity.  What are tips for following MyPlate?  To follow MyPlate recommendations:  · Eat a wide variety of fruits and vegetables, grains, and protein foods.  · Serve smaller portions and eat less food throughout the day.  · Limit portion sizes to avoid overeating.  · Enjoy your food.  · Get at least 150 minutes of exercise every week. This is about 30 minutes each day, 5 or more days per week.  It can be difficult to have every meal look like MyPlate. Think about MyPlate as eating guidelines for an entire day, rather than each individual meal.  Fruits and vegetables  · Make half of your plate fruits and vegetables.  · Eat many different colors of fruits and vegetables each day.  · For a 2,000 calorie daily food plan, eat:  ? 2½ cups of vegetables every day.  ? 2 cups of fruit every day.  · 1 cup is equal to:  ? 1 cup raw or cooked vegetables.  ? 1 cup raw fruit.  ? 1 medium-sized orange, apple, or banana.  ? 1 cup 100% fruit or vegetable juice.  ? 2 cups raw leafy greens, such as lettuce, spinach, or kale.  ? ½ cup dried fruit.  Grains  · One fourth of your plate should be grains.  · Make at least half of the grains you eat each day whole grains.  · For a 2,000 calorie daily food plan, eat 6 oz of grains every day.  · 1 oz is equal to:  ? 1 slice bread.  ? 1 cup cereal.  ? ½ cup cooked rice, cereal, or pasta.  Protein  · One fourth of your plate should be protein.  · Eat a wide variety of protein foods, including meat, poultry, fish, eggs, beans, nuts, and tofu.  · For a 2,000 calorie daily food plan, eat 5½ oz of protein every day.  · 1 oz is equal to:  ? 1 oz meat, poultry, or fish.  ? ¼ cup cooked beans.  ? 1 egg.  ? ½ oz nuts  or seeds.  ? 1 Tbsp peanut butter.  Dairy  · Drink fat-free or low-fat (1%) milk.  · Eat or drink dairy as a side to meals.  · For a 2,000 calorie daily food plan, eat or drink 3 cups of dairy every day.  · 1 cup is equal to:  ? 1 cup milk, yogurt, cottage cheese, or soy milk (soy beverage).  ? 2 oz processed cheese.  ? 1½ oz natural cheese.  Fats, oils, salt, and sugars  · Only small amounts of oils are recommended.  · Avoid foods that are high in calories and low in nutritional value (empty calories), like foods high in fat or added sugars.  · Choose foods that are low in salt (sodium). Choose foods that have less than 140 milligrams (mg) of sodium per serving.  · Drink water instead of sugary drinks. Drink enough water each day to keep your urine pale yellow.  Where to find support  · Work with your health care provider or a nutrition specialist (dietitian) to develop a customized eating plan that is right for you.  · Download an lobo (mobile application) to help you track your daily food intake.  Where to find more information  · Go to ChooseMyPlate.gov for more information.  · Learn more and log your daily food intake according to MyPlate using USDA's SuperTracker: www.Innoverneer.usda.gov  Summary  · MyPlate is a general guideline for healthy eating from the USDA. It is based on the 2010 Dietary Guidelines for Americans.  · In general, fruits and vegetables should take up ½ of your plate, grains should take up ¼ of your plate, and protein should take up ¼ of your plate.  This information is not intended to replace advice given to you by your health care provider. Make sure you discuss any questions you have with your health care provider.  Document Released: 01/06/2009 Document Revised: 01/19/2019 Document Reviewed: 03/19/2018  [employer] Patient Education © 2020 [employer] Inc.

## 2021-11-14 DIAGNOSIS — F33.1 MODERATE EPISODE OF RECURRENT MAJOR DEPRESSIVE DISORDER (HCC): ICD-10-CM

## 2021-11-14 DIAGNOSIS — E03.8 SUBCLINICAL HYPOTHYROIDISM: ICD-10-CM

## 2021-11-15 ENCOUNTER — HOSPITAL ENCOUNTER (OUTPATIENT)
Dept: LAB | Facility: MEDICAL CENTER | Age: 51
End: 2021-11-15
Attending: NURSE PRACTITIONER
Payer: COMMERCIAL

## 2021-11-15 DIAGNOSIS — E03.8 SUBCLINICAL HYPOTHYROIDISM: ICD-10-CM

## 2021-11-15 DIAGNOSIS — Z13.21 ENCOUNTER FOR VITAMIN DEFICIENCY SCREENING: ICD-10-CM

## 2021-11-15 DIAGNOSIS — Z13.6 SCREENING FOR CARDIOVASCULAR CONDITION: ICD-10-CM

## 2021-11-15 LAB
ALBUMIN SERPL BCP-MCNC: 4.4 G/DL (ref 3.2–4.9)
ALBUMIN/GLOB SERPL: 1.6 G/DL
ALP SERPL-CCNC: 133 U/L (ref 30–99)
ALT SERPL-CCNC: 40 U/L (ref 2–50)
ANION GAP SERPL CALC-SCNC: 7 MMOL/L (ref 7–16)
AST SERPL-CCNC: 25 U/L (ref 12–45)
BASOPHILS # BLD AUTO: 0.7 % (ref 0–1.8)
BASOPHILS # BLD: 0.07 K/UL (ref 0–0.12)
BILIRUB SERPL-MCNC: 0.5 MG/DL (ref 0.1–1.5)
BUN SERPL-MCNC: 14 MG/DL (ref 8–22)
CALCIUM SERPL-MCNC: 9.4 MG/DL (ref 8.5–10.5)
CHLORIDE SERPL-SCNC: 104 MMOL/L (ref 96–112)
CHOLEST SERPL-MCNC: 178 MG/DL (ref 100–199)
CO2 SERPL-SCNC: 28 MMOL/L (ref 20–33)
CREAT SERPL-MCNC: 1.01 MG/DL (ref 0.5–1.4)
EOSINOPHIL # BLD AUTO: 0.22 K/UL (ref 0–0.51)
EOSINOPHIL NFR BLD: 2.1 % (ref 0–6.9)
ERYTHROCYTE [DISTWIDTH] IN BLOOD BY AUTOMATED COUNT: 47.2 FL (ref 35.9–50)
EST. AVERAGE GLUCOSE BLD GHB EST-MCNC: 114 MG/DL
FASTING STATUS PATIENT QL REPORTED: NORMAL
GLOBULIN SER CALC-MCNC: 2.8 G/DL (ref 1.9–3.5)
GLUCOSE SERPL-MCNC: 98 MG/DL (ref 65–99)
HBA1C MFR BLD: 5.6 % (ref 4–5.6)
HCT VFR BLD AUTO: 47.8 % (ref 37–47)
HDLC SERPL-MCNC: 45 MG/DL
HGB BLD-MCNC: 16 G/DL (ref 12–16)
IMM GRANULOCYTES # BLD AUTO: 0.03 K/UL (ref 0–0.11)
IMM GRANULOCYTES NFR BLD AUTO: 0.3 % (ref 0–0.9)
LDLC SERPL CALC-MCNC: 108 MG/DL
LYMPHOCYTES # BLD AUTO: 3.22 K/UL (ref 1–4.8)
LYMPHOCYTES NFR BLD: 31 % (ref 22–41)
MCH RBC QN AUTO: 32.3 PG (ref 27–33)
MCHC RBC AUTO-ENTMCNC: 33.5 G/DL (ref 33.6–35)
MCV RBC AUTO: 96.4 FL (ref 81.4–97.8)
MONOCYTES # BLD AUTO: 0.68 K/UL (ref 0–0.85)
MONOCYTES NFR BLD AUTO: 6.5 % (ref 0–13.4)
NEUTROPHILS # BLD AUTO: 6.18 K/UL (ref 2–7.15)
NEUTROPHILS NFR BLD: 59.4 % (ref 44–72)
NRBC # BLD AUTO: 0 K/UL
NRBC BLD-RTO: 0 /100 WBC
PLATELET # BLD AUTO: 326 K/UL (ref 164–446)
PMV BLD AUTO: 11.6 FL (ref 9–12.9)
POTASSIUM SERPL-SCNC: 4.7 MMOL/L (ref 3.6–5.5)
PROT SERPL-MCNC: 7.2 G/DL (ref 6–8.2)
RBC # BLD AUTO: 4.96 M/UL (ref 4.2–5.4)
SODIUM SERPL-SCNC: 139 MMOL/L (ref 135–145)
T4 FREE SERPL-MCNC: 0.95 NG/DL (ref 0.93–1.7)
TRIGL SERPL-MCNC: 124 MG/DL (ref 0–149)
TSH SERPL DL<=0.005 MIU/L-ACNC: 6.39 UIU/ML (ref 0.38–5.33)
VIT B12 SERPL-MCNC: 598 PG/ML (ref 211–911)
WBC # BLD AUTO: 10.4 K/UL (ref 4.8–10.8)

## 2021-11-15 PROCEDURE — 84443 ASSAY THYROID STIM HORMONE: CPT

## 2021-11-15 PROCEDURE — 83036 HEMOGLOBIN GLYCOSYLATED A1C: CPT

## 2021-11-15 PROCEDURE — 84439 ASSAY OF FREE THYROXINE: CPT

## 2021-11-15 PROCEDURE — 80053 COMPREHEN METABOLIC PANEL: CPT

## 2021-11-15 PROCEDURE — 82306 VITAMIN D 25 HYDROXY: CPT

## 2021-11-15 PROCEDURE — 82607 VITAMIN B-12: CPT

## 2021-11-15 PROCEDURE — 80061 LIPID PANEL: CPT

## 2021-11-15 PROCEDURE — 85025 COMPLETE CBC W/AUTO DIFF WBC: CPT

## 2021-11-15 PROCEDURE — 36415 COLL VENOUS BLD VENIPUNCTURE: CPT

## 2021-11-15 RX ORDER — PAROXETINE HYDROCHLORIDE 40 MG/1
40 TABLET, FILM COATED ORAL
Qty: 90 TABLET | Refills: 0 | Status: SHIPPED | OUTPATIENT
Start: 2021-11-15 | End: 2022-01-03 | Stop reason: SDUPTHER

## 2021-11-15 RX ORDER — LEVOTHYROXINE SODIUM 0.05 MG/1
50 TABLET ORAL
Qty: 90 TABLET | Refills: 0 | Status: SHIPPED | OUTPATIENT
Start: 2021-11-15 | End: 2022-02-16 | Stop reason: SDUPTHER

## 2021-11-16 NOTE — TELEPHONE ENCOUNTER
Patient needs to schedule follow-up visit and have labs done.  Only 90-day supply of meds sent to pharmacy.

## 2021-11-16 NOTE — TELEPHONE ENCOUNTER
Received request via: Patient    Was the patient seen in the last year in this department? Yes    Does the patient have an active prescription (recently filled or refills available) for medication(s) requested? No    Requested Prescriptions     Pending Prescriptions Disp Refills    levothyroxine (SYNTHROID) 50 MCG Tab 90 Tablet 3     Sig: Take 1 Tablet by mouth every morning on an empty stomach.    paroxetine (PAXIL) 40 MG tablet 90 Tablet 3     Sig: Take 1 Tablet by mouth every day.

## 2021-11-17 LAB — 25(OH)D3 SERPL-MCNC: 35 NG/ML (ref 30–80)

## 2021-11-18 NOTE — RESULT ENCOUNTER NOTE
Released to Mauro,  Your labs show one of your thyroid labs T4 is now normal, the other TSH is slightly out of range. It should be rechecked or your dose adjusted in the future, please make a follow up visit in clinic for this with Rosaura anytime in next 3 months.   Your hematocrit is slightly high, could be from smoking, this increases your risk of blood clots, please stop smoking ASAP, let us know if you need help with this: counseling or medications like chantix, patches, wellbutrin. Best to discuss this at a clinic visit. Slight elevation in LDL cholesterol: healthy diet changes can improve this, slight elevation in alkaline phosphatase could be from arthritis or fatty liver.   Please make an appt to discuss results and for follow up.   Other labs look good!  Bird Issa M.D.  (Covering for Rosaura a few days while she is off)

## 2021-12-23 ENCOUNTER — HOSPITAL ENCOUNTER (OUTPATIENT)
Dept: RADIOLOGY | Facility: MEDICAL CENTER | Age: 51
End: 2021-12-23
Attending: NURSE PRACTITIONER
Payer: COMMERCIAL

## 2021-12-23 DIAGNOSIS — Z12.31 VISIT FOR SCREENING MAMMOGRAM: ICD-10-CM

## 2021-12-23 PROCEDURE — 77063 BREAST TOMOSYNTHESIS BI: CPT

## 2021-12-30 DIAGNOSIS — R92.30 DENSE BREASTS: ICD-10-CM

## 2021-12-30 DIAGNOSIS — R92.2 DENSE BREASTS: ICD-10-CM

## 2022-01-03 ENCOUNTER — OFFICE VISIT (OUTPATIENT)
Dept: MEDICAL GROUP | Facility: PHYSICIAN GROUP | Age: 52
End: 2022-01-03
Payer: COMMERCIAL

## 2022-01-03 VITALS
SYSTOLIC BLOOD PRESSURE: 134 MMHG | OXYGEN SATURATION: 99 % | TEMPERATURE: 97.3 F | BODY MASS INDEX: 28.6 KG/M2 | WEIGHT: 182.2 LBS | HEIGHT: 67 IN | RESPIRATION RATE: 14 BRPM | DIASTOLIC BLOOD PRESSURE: 88 MMHG | HEART RATE: 57 BPM

## 2022-01-03 DIAGNOSIS — E03.8 SUBCLINICAL HYPOTHYROIDISM: ICD-10-CM

## 2022-01-03 DIAGNOSIS — F41.1 GAD (GENERALIZED ANXIETY DISORDER): ICD-10-CM

## 2022-01-03 DIAGNOSIS — F33.1 MODERATE EPISODE OF RECURRENT MAJOR DEPRESSIVE DISORDER (HCC): ICD-10-CM

## 2022-01-03 PROCEDURE — 99214 OFFICE O/P EST MOD 30 MIN: CPT | Performed by: NURSE PRACTITIONER

## 2022-01-03 RX ORDER — PAROXETINE HYDROCHLORIDE 40 MG/1
40 TABLET, FILM COATED ORAL
Qty: 90 TABLET | Refills: 1 | Status: SHIPPED | OUTPATIENT
Start: 2022-01-03 | End: 2022-02-16

## 2022-01-03 ASSESSMENT — ANXIETY QUESTIONNAIRES
6. BECOMING EASILY ANNOYED OR IRRITABLE: NEARLY EVERY DAY
3. WORRYING TOO MUCH ABOUT DIFFERENT THINGS: NEARLY EVERY DAY
5. BEING SO RESTLESS THAT IT IS HARD TO SIT STILL: NOT AT ALL
4. TROUBLE RELAXING: NEARLY EVERY DAY
1. FEELING NERVOUS, ANXIOUS, OR ON EDGE: NEARLY EVERY DAY
7. FEELING AFRAID AS IF SOMETHING AWFUL MIGHT HAPPEN: NEARLY EVERY DAY
IF YOU CHECKED OFF ANY PROBLEMS ON THIS QUESTIONNAIRE, HOW DIFFICULT HAVE THESE PROBLEMS MADE IT FOR YOU TO DO YOUR WORK, TAKE CARE OF THINGS AT HOME, OR GET ALONG WITH OTHER PEOPLE: EXTREMELY DIFFICULT
GAD7 TOTAL SCORE: 18
2. NOT BEING ABLE TO STOP OR CONTROL WORRYING: NEARLY EVERY DAY

## 2022-01-03 ASSESSMENT — PATIENT HEALTH QUESTIONNAIRE - PHQ9
SUM OF ALL RESPONSES TO PHQ9 QUESTIONS 1 AND 2: 4
7. TROUBLE CONCENTRATING ON THINGS, SUCH AS READING THE NEWSPAPER OR WATCHING TELEVISION: NEARLY EVERY DAY
SUM OF ALL RESPONSES TO PHQ QUESTIONS 1-9: 16
4. FEELING TIRED OR HAVING LITTLE ENERGY: MORE THAN HALF THE DAYS
8. MOVING OR SPEAKING SO SLOWLY THAT OTHER PEOPLE COULD HAVE NOTICED. OR THE OPPOSITE, BEING SO FIGETY OR RESTLESS THAT YOU HAVE BEEN MOVING AROUND A LOT MORE THAN USUAL: NOT AT ALL
9. THOUGHTS THAT YOU WOULD BE BETTER OFF DEAD, OR OF HURTING YOURSELF: SEVERAL DAYS
6. FEELING BAD ABOUT YOURSELF - OR THAT YOU ARE A FAILURE OR HAVE LET YOURSELF OR YOUR FAMILY DOWN: MORE THAN HALF THE DAYS
3. TROUBLE FALLING OR STAYING ASLEEP OR SLEEPING TOO MUCH: MORE THAN HALF THE DAYS
2. FEELING DOWN, DEPRESSED, IRRITABLE, OR HOPELESS: MORE THAN HALF THE DAYS
1. LITTLE INTEREST OR PLEASURE IN DOING THINGS: MORE THAN HALF THE DAYS
5. POOR APPETITE OR OVEREATING: MORE THAN HALF THE DAYS

## 2022-01-03 ASSESSMENT — FIBROSIS 4 INDEX: FIB4 SCORE: 0.59

## 2022-01-03 NOTE — PATIENT INSTRUCTIONS
[facility]   [address], [address]  [place] [address]  [phone]      Major Depressive Disorder, Adult  Major depressive disorder (MDD) is a mental health condition. MDD often makes you feel sad, hopeless, or helpless. MDD can also cause symptoms in your body. MDD can affect your:  · Work.  · School.  · Relationships.  · Other normal activities.  MDD can range from mild to very bad. It may occur once (single episode MDD). It can also occur many times (recurrent MDD).  The main symptoms of MDD often include:  · Feeling sad, depressed, or irritable most of the time.  · Loss of interest.  MDD symptoms also include:  · Sleeping too much or too little.  · Eating too much or too little.  · A change in your weight.  · Feeling tired (fatigue) or having low energy.  · Feeling worthless.  · Feeling guilty.  · Trouble making decisions.  · Trouble thinking clearly.  · Thoughts of suicide or harming others.  · Feeling weak.  · Feeling agitated.  · Keeping yourself from being around other people (isolation).  Follow these instructions at home:  Activity  · Do these things as told by your doctor:  ? Go back to your normal activities.  ? Exercise regularly.  ? Spend time outdoors.  Alcohol  · Talk with your doctor about how alcohol can affect your antidepressant medicines.  · Do not drink alcohol. Or, limit how much alcohol you drink.  ? This means no more than 1 drink a day for nonpregnant women and 2 drinks a day for men. One drink equals one of these:  § 12 oz of beer.  § 5 oz of wine.  § 1½ oz of hard liquor.  General instructions  · Take over-the-counter and prescription medicines only as told by your doctor.  · Eat a healthy diet.  · Get plenty of sleep.  · Find activities that you enjoy. Make time to do them.  · Think about joining a support group. Your doctor may be able to suggest a group for you.  · Keep all follow-up visits as told by your doctor. This is important.  Where to find more  information:  · National [place] on Mental Illness:  ? www.alyssa.org  · [place] National [place] of Mental Health:  ? www.[facility].nih.gov  · National Suicide Prevention Lifeline:  ? 6-077-278-9510. This is free, 24-hour help.  Contact a doctor if:  · Your symptoms get worse.  · You have new symptoms.  Get help right away if:  · You self-harm.  · You see, hear, taste, smell, or feel things that are not present (hallucinate).  If you ever feel like you may hurt yourself or others, or have thoughts about taking your own life, get help right away. You can go to your nearest emergency department or call:  · Your local emergency services (911 in the [place]  · A suicide crisis helpline, such as the National Suicide Prevention Lifeline:  ? 2-199-593-1172. This is open 24 hours a day.  This information is not intended to replace advice given to you by your health care provider. Make sure you discuss any questions you have with your health care provider.  Document Released: 11/28/2016 Document Revised: 11/30/2018 Document Reviewed: 09/03/2017  [employer] Patient Education © 2020 [employer] Inc.

## 2022-01-04 NOTE — ASSESSMENT & PLAN NOTE
VANESSA 7 10/20/2021 1/3/2022   VANESSA-7 Total Score 17 18       Interpretation of VANESSA 7 Total Score   Score Severity:  0-4 No Anxiety   5-9 Mild Anxiety  10-14 Moderate Anxiety  15-21 Severe Anxiety

## 2022-01-04 NOTE — PROGRESS NOTES
"Chief Complaint   Patient presents with   • Depression     Medication not working anymore        Subjective:     HPI:   Caro Harding is a 51 y.o. female here to discuss the evaluation and management of:      VANESSA (generalized anxiety disorder)  VANESSA 7 10/20/2021 1/3/2022   VANESSA-7 Total Score 17 18       Interpretation of VANESSA 7 Total Score   Score Severity:  0-4 No Anxiety   5-9 Mild Anxiety  10-14 Moderate Anxiety  15-21 Severe Anxiety      Recurrent major depressive disorder (HCC)  Depression Screen (PHQ-2/PHQ-9) 1/15/2020 10/20/2021 1/3/2022   PHQ-2 Total Score 0 - 4   PHQ-2 Total Score - 0 -   PHQ-9 Total Score 3 - 16   PHQ-9 Total Score - - -       Interpretation of PHQ-9 Total Score   Score Severity   1-4 No Depression   5-9 Mild Depression   10-14 Moderate Depression   15-19 Moderately Severe Depression   20-27 Severe Depression    This is a chronic condition with exacerbation.  Patient reports today that her depression anxiety symptoms are through the roof.  She is positive for suicidal ideations however she denies having any plan and states that it is \"more of a feeling that she should just leave the state and never come back.\"  She is currently going through a divorce and has moved in with her parents.    She is currently taking Paxil 40 mg and has been on the same dose for several years and has been tolerating it well.    In-depth discussion with patient about starting adjacent medications or referral to psychiatry for further evaluation and treatment.  Patient was agreeable to referral to psychiatry.  Referral was placed today.  Patient will continue on current Paxil dose.  Refill Paxil sent to pharmacy.  She is currently in counseling and will continue with her counselor.    ER precautions were discussed and suicide hotline information was given and discharge instructions.      Subclinical hypothyroidism  This is a chronic and uncontrolled condition.  Patient reports that she has not been taking her " levothyroxine.  Her TSH level is elevated after reviewing most recent lab work.  She does have symptoms including depression, constipation, fatigue, and moodiness.    In-depth discussion with patient about hypothyroidism symptoms and management.  Patient is agreeable to restarting her levothyroxine.  Previous endocrinology referral was placed in 2020 however patient has not followed up.    Patient will repeat labs in 6 to 8 weeks after restarting her levothyroxine.          ROS:  Per HPI      No Known Allergies    Current medicines (including changes today)  Current Outpatient Medications   Medication Sig Dispense Refill   • paroxetine (PAXIL) 40 MG tablet Take 1 Tablet by mouth every day. 90 Tablet 1   • levothyroxine (SYNTHROID) 50 MCG Tab Take 1 Tablet by mouth every morning on an empty stomach. 90 Tablet 0     No current facility-administered medications for this visit.       Social History     Tobacco Use   • Smoking status: Current Every Day Smoker     Packs/day: 1.00     Years: 30.00     Pack years: 30.00     Types: Cigarettes     Last attempt to quit: 2014     Years since quittin.0   • Smokeless tobacco: Never Used   Vaping Use   • Vaping Use: Never used   Substance Use Topics   • Alcohol use: No   • Drug use: Yes     Types: Marijuana       Patient Active Problem List    Diagnosis Date Noted   • VANESSA (generalized anxiety disorder) 10/20/2021   • Subclinical hypothyroidism 2020   • Chronic right shoulder pain 2020   • Cough 01/15/2020   • Arthralgia 2019   • Tobacco dependence due to cigarettes 2019   • Recurrent major depressive disorder (HCC) 2019       Family History   Problem Relation Age of Onset   • Hypertension Mother    • Arthritis Father    • Hyperlipidemia Father    • Hypertension Father    • Cancer Maternal Grandmother    • Psychiatric Illness Maternal Grandmother    • Diabetes Paternal Grandfather    • Heart Attack Paternal Grandfather    • Hyperlipidemia  Paternal Grandfather    • Heart Disease Paternal Grandfather    • Stroke Maternal Grandfather    • Dementia Paternal Grandmother    • Cancer Paternal Grandmother           Objective:     No visits with results within 1 Month(s) from this visit.   Latest known visit with results is:   Hospital Outpatient Visit on 11/15/2021   Component Date Value Ref Range Status   • Vitamin B12 -True Cobalamin 11/15/2021 598  211 - 911 pg/mL Final   • TSH 11/15/2021 6.390* 0.380 - 5.330 uIU/mL Final    Comment: Reference Range:    Pregnant Females, 1st Trimester  0.050-3.700  Pregnant Females, 2nd Trimester  0.310-4.350  Pregnant Females, 3rd Trimester  0.410-5.180     • WBC 11/15/2021 10.4  4.8 - 10.8 K/uL Final   • RBC 11/15/2021 4.96  4.20 - 5.40 M/uL Final   • Hemoglobin 11/15/2021 16.0  12.0 - 16.0 g/dL Final   • Hematocrit 11/15/2021 47.8* 37.0 - 47.0 % Final   • MCV 11/15/2021 96.4  81.4 - 97.8 fL Final   • MCH 11/15/2021 32.3  27.0 - 33.0 pg Final   • MCHC 11/15/2021 33.5* 33.6 - 35.0 g/dL Final   • RDW 11/15/2021 47.2  35.9 - 50.0 fL Final   • Platelet Count 11/15/2021 326  164 - 446 K/uL Final   • MPV 11/15/2021 11.6  9.0 - 12.9 fL Final   • Neutrophils-Polys 11/15/2021 59.40  44.00 - 72.00 % Final   • Lymphocytes 11/15/2021 31.00  22.00 - 41.00 % Final   • Monocytes 11/15/2021 6.50  0.00 - 13.40 % Final   • Eosinophils 11/15/2021 2.10  0.00 - 6.90 % Final   • Basophils 11/15/2021 0.70  0.00 - 1.80 % Final   • Immature Granulocytes 11/15/2021 0.30  0.00 - 0.90 % Final   • Nucleated RBC 11/15/2021 0.00  /100 WBC Final   • Neutrophils (Absolute) 11/15/2021 6.18  2.00 - 7.15 K/uL Final    Includes immature neutrophils, if present.   • Lymphs (Absolute) 11/15/2021 3.22  1.00 - 4.80 K/uL Final   • Monos (Absolute) 11/15/2021 0.68  0.00 - 0.85 K/uL Final   • Eos (Absolute) 11/15/2021 0.22  0.00 - 0.51 K/uL Final   • Baso (Absolute) 11/15/2021 0.07  0.00 - 0.12 K/uL Final   • Immature Granulocytes (abs) 11/15/2021 0.03  0.00 -  0.11 K/uL Final   • NRBC (Absolute) 11/15/2021 0.00  K/uL Final   • 25-Hydroxy   Vitamin D 25 11/15/2021 35  30 - 80 ng/mL Final    Comment: INTERPRETIVE INFORMATION: Vitamin D, 25-Hydroxy  This assay accurately quantifies the sum of vitamin D3, 25-hydroxy  and vitamin D2, 25-hydroxy.  0-17 years:  Deficiency: less than 20 ng/mL  Optimum level: greater than or equal to 20 ng/mL*  *(Mccrray CL et al. Pediatrics 2008; 122: 1142-52.)  18 years and older:  Deficiency: Less than 20 ng/mL  Insufficiency: 20-29 ng/mL  Optimum Level: 30-80 ng/mL  Possible Toxicity: Greater than 150 ng/mL  Performed By: [facility]  [address] [place] [id number]  : Bisi Morgan MD     • Glycohemoglobin 11/15/2021 5.6  4.0 - 5.6 % Final    Comment: Increased risk for diabetes:  5.7 -6.4%  Diabetes:  >6.4%  Glycemic control for adults with diabetes:  <7.0%    The above interpretations are per ADA guidelines.  Diagnosis  of diabetes mellitus on the basis of elevated Hemoglobin A1c  should be confirmed by repeating the Hb A1c test.     • Est Avg Glucose 11/15/2021 114  mg/dL Final    Comment: The eAG calculation is based on the A1c-Derived Daily Glucose  (ADAG) study.  See the ADA's website for additional information.     • Cholesterol,Tot 11/15/2021 178  100 - 199 mg/dL Final   • Triglycerides 11/15/2021 124  0 - 149 mg/dL Final   • HDL 11/15/2021 45  >=40 mg/dL Final   • LDL 11/15/2021 108* <100 mg/dL Final   • Sodium 11/15/2021 139  135 - 145 mmol/L Final   • Potassium 11/15/2021 4.7  3.6 - 5.5 mmol/L Final   • Chloride 11/15/2021 104  96 - 112 mmol/L Final   • Co2 11/15/2021 28  20 - 33 mmol/L Final   • Anion Gap 11/15/2021 7.0  7.0 - 16.0 Final   • Glucose 11/15/2021 98  65 - 99 mg/dL Final   • Bun 11/15/2021 14  8 - 22 mg/dL Final   • Creatinine 11/15/2021 1.01  0.50 - 1.40 mg/dL Final   • Calcium 11/15/2021 9.4  8.5 - 10.5 mg/dL Final   • AST(SGOT) 11/15/2021 25  12 - 45 U/L Final   • ALT(SGPT)  "11/15/2021 40  2 - 50 U/L Final   • Alkaline Phosphatase 11/15/2021 133* 30 - 99 U/L Final   • Total Bilirubin 11/15/2021 0.5  0.1 - 1.5 mg/dL Final   • Albumin 11/15/2021 4.4  3.2 - 4.9 g/dL Final   • Total Protein 11/15/2021 7.2  6.0 - 8.2 g/dL Final   • Globulin 11/15/2021 2.8  1.9 - 3.5 g/dL Final   • A-G Ratio 11/15/2021 1.6  g/dL Final   • Fasting Status 11/15/2021 Fasting   Final   • GFR If  11/15/2021 >60  >60 mL/min/1.73 m 2 Final   • GFR If Non  11/15/2021 58* >60 mL/min/1.73 m 2 Final   • Free T-4 11/15/2021 0.95  0.93 - 1.70 ng/dL Final       /88 (BP Location: Left arm, Patient Position: Sitting, BP Cuff Size: Adult)   Pulse (!) 57   Temp 36.3 °C (97.3 °F) (Temporal)   Resp 14   Ht 1.702 m (5' 7\")   Wt 82.6 kg (182 lb 3.2 oz)   SpO2 99%  Body mass index is 28.54 kg/m².     Physical Exam:  Constitutional: Well-developed and well-nourished female in NAD. Not diaphoretic. No distress.   Skin: warm, dry, intact, no evidence of rash or concerning lesions  Head: Atraumatic without lesions.  Eyes: Conjunctivae and extraocular motions are normal. Pupils are equal, round, and reactive to light. No scleral icterus.   Neck: Supple, trachea midline. No thyromegaly present. No cervical or supraclavicular lymphadenopathy.  Cardiovascular: Regular rate and rhythm without murmur. Radial pulses are intact and equal bilaterally.  Pulmonary: Clear to ausculation. Normal effort. No rales, ronchi, or wheezing.  Abdomen: Soft, non tender, and without distention. Active bowel sounds in all four quadrants. No rebound, guarding, masses   Extremities: No cyanosis, clubbing, erythema, nor edema.   Neurological: Alert and oriented x 3. No cranial nerve deficit. 5/5 myotomes. Sensation intact.   Psychiatric:  Behavior, mood, and affect are appropriate.    Assessment and Plan:     The following treatment plan was discussed:  I have reviewed all labs with patient and answered all " questions.    1. Subclinical hypothyroidism  - TRIIDOTHYRONINE; Future  - FREE THYROXINE; Future  - TSH; Future    2. Moderate episode of recurrent major depressive disorder (HCC)  - Referral to Behavioral Health  - paroxetine (PAXIL) 40 MG tablet; Take 1 Tablet by mouth every day.  Dispense: 90 Tablet; Refill: 1    3. VANESSA (generalized anxiety disorder)  - Referral to Behavioral Health      Any change or worsening of signs or symptoms, patient encouraged to follow-up or report to emergency room for further evaluation. Patient verbalizes understanding and agrees.    Follow-Up: Return in about 6 weeks (around 2/14/2022) for TSH levels.      PLEASE NOTE: This dictation was created using voice recognition software. I have made every reasonable attempt to correct obvious errors, but I expect that there are errors of grammar and possibly content that I did not discover before finalizing the note.

## 2022-01-04 NOTE — ASSESSMENT & PLAN NOTE
This is a chronic and uncontrolled condition.  Patient reports that she has not been taking her levothyroxine.  Her TSH level is elevated after reviewing most recent lab work.  She does have symptoms including depression, constipation, fatigue, and moodiness.    In-depth discussion with patient about hypothyroidism symptoms and management.  Patient is agreeable to restarting her levothyroxine.  Previous endocrinology referral was placed in March 2020 however patient has not followed up.    Patient will repeat labs in 6 to 8 weeks after restarting her levothyroxine.

## 2022-01-04 NOTE — ASSESSMENT & PLAN NOTE
"Depression Screen (PHQ-2/PHQ-9) 1/15/2020 10/20/2021 1/3/2022   PHQ-2 Total Score 0 - 4   PHQ-2 Total Score - 0 -   PHQ-9 Total Score 3 - 16   PHQ-9 Total Score - - -       Interpretation of PHQ-9 Total Score   Score Severity   1-4 No Depression   5-9 Mild Depression   10-14 Moderate Depression   15-19 Moderately Severe Depression   20-27 Severe Depression    This is a chronic condition with exacerbation.  Patient reports today that her depression anxiety symptoms are through the roof.  She is positive for suicidal ideations however she denies having any plan and states that it is \"more of a feeling that she should just leave the state and never come back.\"  She is currently going through a divorce and has moved in with her parents.    She is currently taking Paxil 40 mg and has been on the same dose for several years and has been tolerating it well.    In-depth discussion with patient about starting adjacent medications or referral to psychiatry for further evaluation and treatment.  Patient was agreeable to referral to psychiatry.  Referral was placed today.  Patient will continue on current Paxil dose.  Refill Paxil sent to pharmacy.  She is currently in counseling and will continue with her counselor.    ER precautions were discussed and suicide hotline information was given and discharge instructions.    "

## 2022-02-16 ENCOUNTER — OFFICE VISIT (OUTPATIENT)
Dept: MEDICAL GROUP | Facility: PHYSICIAN GROUP | Age: 52
End: 2022-02-16
Payer: COMMERCIAL

## 2022-02-16 VITALS
OXYGEN SATURATION: 98 % | DIASTOLIC BLOOD PRESSURE: 70 MMHG | BODY MASS INDEX: 28.6 KG/M2 | WEIGHT: 182.2 LBS | RESPIRATION RATE: 16 BRPM | HEART RATE: 73 BPM | TEMPERATURE: 97 F | SYSTOLIC BLOOD PRESSURE: 130 MMHG | HEIGHT: 67 IN

## 2022-02-16 DIAGNOSIS — F33.1 MODERATE EPISODE OF RECURRENT MAJOR DEPRESSIVE DISORDER (HCC): ICD-10-CM

## 2022-02-16 DIAGNOSIS — Z12.11 COLON CANCER SCREENING: ICD-10-CM

## 2022-02-16 DIAGNOSIS — F41.1 GAD (GENERALIZED ANXIETY DISORDER): ICD-10-CM

## 2022-02-16 DIAGNOSIS — E03.8 SUBCLINICAL HYPOTHYROIDISM: ICD-10-CM

## 2022-02-16 DIAGNOSIS — F17.210 TOBACCO DEPENDENCE DUE TO CIGARETTES: ICD-10-CM

## 2022-02-16 PROCEDURE — 99214 OFFICE O/P EST MOD 30 MIN: CPT | Mod: 25 | Performed by: NURSE PRACTITIONER

## 2022-02-16 PROCEDURE — 99406 BEHAV CHNG SMOKING 3-10 MIN: CPT | Performed by: NURSE PRACTITIONER

## 2022-02-16 RX ORDER — NICOTINE 21 MG/24HR
1 PATCH, TRANSDERMAL 24 HOURS TRANSDERMAL EVERY 24 HOURS
Qty: 30 PATCH | Refills: 3 | Status: SHIPPED | OUTPATIENT
Start: 2022-02-16 | End: 2022-05-11

## 2022-02-16 RX ORDER — FLUOXETINE HYDROCHLORIDE 20 MG/1
20 CAPSULE ORAL DAILY
COMMUNITY
Start: 2022-01-23 | End: 2022-02-17 | Stop reason: SDUPTHER

## 2022-02-16 RX ORDER — LEVOTHYROXINE SODIUM 0.05 MG/1
50 TABLET ORAL
Qty: 90 TABLET | Refills: 0 | Status: SHIPPED | OUTPATIENT
Start: 2022-02-16 | End: 2022-05-17 | Stop reason: SDUPTHER

## 2022-02-16 ASSESSMENT — ANXIETY QUESTIONNAIRES
1. FEELING NERVOUS, ANXIOUS, OR ON EDGE: NEARLY EVERY DAY
3. WORRYING TOO MUCH ABOUT DIFFERENT THINGS: NEARLY EVERY DAY
4. TROUBLE RELAXING: SEVERAL DAYS
2. NOT BEING ABLE TO STOP OR CONTROL WORRYING: NEARLY EVERY DAY
7. FEELING AFRAID AS IF SOMETHING AWFUL MIGHT HAPPEN: SEVERAL DAYS
6. BECOMING EASILY ANNOYED OR IRRITABLE: NEARLY EVERY DAY
5. BEING SO RESTLESS THAT IT IS HARD TO SIT STILL: NEARLY EVERY DAY
GAD7 TOTAL SCORE: 17

## 2022-02-16 ASSESSMENT — PATIENT HEALTH QUESTIONNAIRE - PHQ9
5. POOR APPETITE OR OVEREATING: 3 - NEARLY EVERY DAY
CLINICAL INTERPRETATION OF PHQ2 SCORE: 3
SUM OF ALL RESPONSES TO PHQ QUESTIONS 1-9: 16

## 2022-02-16 ASSESSMENT — FIBROSIS 4 INDEX: FIB4 SCORE: 0.61

## 2022-02-16 NOTE — ASSESSMENT & PLAN NOTE
VANESSA 7 10/20/2021 1/3/2022 2/16/2022   VANESSA-7 Total Score 17 18 17       Interpretation of VANESSA 7 Total Score   Score Severity:  0-4 No Anxiety   5-9 Mild Anxiety  10-14 Moderate Anxiety  15-21 Severe Anxiety    See additional notes in major depressive disorder.

## 2022-02-16 NOTE — LETTER
[facility]  HARPAL Bravo  [address] [place] [address]  Fax: [phone]   Authorization for Release/Disclosure of   Protected Health Information   Name: JAS SINCLAIR : 1970 SSN: [id number]   Address: [address] [place] [address] [address] [place] [address] Phone:    [phone] (home)    I authorize the entity listed below to release/disclose the PHI below to:   [facility]/HARPAL Bravo and HARPAL Bravo   Provider or Entity Name:     Address   City, State, Zip   Phone:      Fax:     Reason for request: continuity of care   Information to be released:    [  ] LAST COLONOSCOPY,  including any PATH REPORT and follow-up  [  ] LAST FIT/COLOGUARD RESULT [  ] LAST DEXA  [  ] LAST MAMMOGRAM  [  ] LAST PAP  [  ] LAST LABS [  ] RETINA EXAM REPORT  [  ] IMMUNIZATION RECORDS  [  ] Release all info      [  ] Check here and initial the line next to each item to release ALL health information INCLUDING  _____ Care and treatment for drug and / or alcohol abuse  _____ HIV testing, infection status, or AIDS  _____ Genetic Testing    DATES OF SERVICE OR TIME PERIOD TO BE DISCLOSED: _____________  I understand and acknowledge that:  * This Authorization may be revoked at any time by you in writing, except if your health information has already been used or disclosed.  * Your health information that will be used or disclosed as a result of you signing this authorization could be re-disclosed by the recipient. If this occurs, your re-disclosed health information may no longer be protected by State or Federal laws.  * You may refuse to sign this Authorization. Your refusal will not affect your ability to obtain treatment.  * This Authorization becomes effective upon signing and will  on (date) __________.      If no date is indicated, this Authorization will  one (1) year from the signature date.    Name: Jas Sinclair    Signature:   Date:     2022        PLEASE FAX REQUESTED RECORDS BACK TO: [phone]

## 2022-02-16 NOTE — ASSESSMENT & PLAN NOTE
Depression Screen (PHQ-2/PHQ-9) 10/20/2021 1/3/2022 2/16/2022   PHQ-2 Total Score - 4 -   PHQ-2 Total Score 0 - 3   PHQ-9 Total Score - 16 -   PHQ-9 Total Score - - 16       Interpretation of PHQ-9 Total Score   Score Severity   1-4 No Depression   5-9 Mild Depression   10-14 Moderate Depression   15-19 Moderately Severe Depression   20-27 Severe Depression    Patient admitted herself to [facility] on January 19 and was discharged on January 22, 2022.   During that time she was positive for suicidal ideations and was cutting.   They switch her medication from Paxil to Prozac on the January 20.    Today she continues to report that she has depression and anxiety symptoms however she denies any suicidal or homicidal thoughts.  She denies any suicidal plan.  She has not followed up with psychiatry referral.    Plan  Discussed coping skills.  Encourage patient to continue with Prozac.  Encourage patient to call and schedule appointment with [facility]

## 2022-02-16 NOTE — ASSESSMENT & PLAN NOTE
Chronic ongoing health concern.  Patient reports that she previously smoking 2 packs/day until [facility] where they placed a nicotine patch on her and she quit smoking during that time.  She is back to smoking about a pack a day however she is interested in quitting smoking.    Plan  In-depth conversation with patient regards to smoking sensation.  Prescription of nicotine patch 21 mg was sent to pharmacy.   was given for approximately 3 to 4 minutes during our exam.

## 2022-02-16 NOTE — PATIENT INSTRUCTIONS
[facility]  [address]  [place]. [address]  Phone: [phone]  Fax: [phone]    Hypothyroidism    Hypothyroidism is when the thyroid gland does not make enough of certain hormones (it is underactive). The thyroid gland is a small gland located in the lower front part of the neck, just in front of the windpipe (trachea). This gland makes hormones that help control how the body uses food for energy (metabolism) as well as how the heart and brain function. These hormones also play a role in keeping your bones strong. When the thyroid is underactive, it produces too little of the hormones thyroxine (T4) and triiodothyronine (T3).  What are the causes?  This condition may be caused by:  · Hashimoto's disease. This is a disease in which the body's disease-fighting system (immune system) attacks the thyroid gland. This is the most common cause.  · Viral infections.  · Pregnancy.  · Certain medicines.  · Birth defects.  · Past radiation treatments to the head or neck for cancer.  · Past treatment with radioactive iodine.  · Past exposure to radiation in the environment.  · Past surgical removal of part or all of the thyroid.  · Problems with a gland in the center of the brain (pituitary gland).  · Lack of enough iodine in the diet.  What increases the risk?  You are more likely to develop this condition if:  · You are female.  · You have a family history of thyroid conditions.  · You use a medicine called lithium.  · You take medicines that affect the immune system (immunosuppressants).  What are the signs or symptoms?  Symptoms of this condition include:  · Feeling as though you have no energy (lethargy).  · Not being able to tolerate cold.  · Weight gain that is not explained by a change in diet or exercise habits.  · Lack of appetite.  · Dry skin.  · Coarse hair.  · Menstrual irregularity.  · Slowing of thought processes.  · Constipation.  · Sadness or depression.  How is  this diagnosed?  This condition may be diagnosed based on:  · Your symptoms, your medical history, and a physical exam.  · Blood tests.  You may also have imaging tests, such as an ultrasound or MRI.  How is this treated?  This condition is treated with medicine that replaces the thyroid hormones that your body does not make. After you begin treatment, it may take several weeks for symptoms to go away.  Follow these instructions at home:  · Take over-the-counter and prescription medicines only as told by your health care provider.  · If you start taking any new medicines, tell your health care provider.  · Keep all follow-up visits as told by your health care provider. This is important.  ? As your condition improves, your dosage of thyroid hormone medicine may change.  ? You will need to have blood tests regularly so that your health care provider can monitor your condition.  Contact a health care provider if:  · Your symptoms do not get better with treatment.  · You are taking thyroid replacement medicine and you:  ? Sweat a lot.  ? Have tremors.  ? Feel anxious.  ? Lose weight rapidly.  ? Cannot tolerate heat.  ? Have emotional swings.  ? Have diarrhea.  ? Feel weak.  Get help right away if you have:  · Chest pain.  · An irregular heartbeat.  · A rapid heartbeat.  · Difficulty breathing.  Summary  · Hypothyroidism is when the thyroid gland does not make enough of certain hormones (it is underactive).  · When the thyroid is underactive, it produces too little of the hormones thyroxine (T4) and triiodothyronine (T3).  · The most common cause is Hashimoto's disease, a disease in which the body's disease-fighting system (immune system) attacks the thyroid gland. The condition can also be caused by viral infections, medicine, pregnancy, or past radiation treatment to the head or neck.  · Symptoms may include weight gain, dry skin, constipation, feeling as though you do not have energy, and not being able to tolerate  cold.  · This condition is treated with medicine to replace the thyroid hormones that your body does not make.  This information is not intended to replace advice given to you by your health care provider. Make sure you discuss any questions you have with your health care provider.  Document Released: 12/18/2006 Document Revised: 11/30/2018 Document Reviewed: 11/28/2018  [employer] Patient Education © 2020 [employer]    Suicidal Feelings: How to Help Yourself  Suicide is when you end your own life. There are many things you can do to help yourself feel better when struggling with these feelings. Many services and people are available to support you and others who struggle with similar feelings.   If you ever feel like you may hurt yourself or others, or have thoughts about taking your own life, get help right away. To get help:  · Call your local emergency services (911 in the [place]  · The [facility] and human services helpline (211 in the [place]  · Go to your nearest emergency department.  · Call a suicide hotline to speak with a trained counselor. The following suicide hotlines are available in the [place]:  ? 6-189-886-TALK (1-282.350.3996).  ? 3-429-KRUHWNV (1-451.449.1751).  ? 1-446.207.6309. This is a hotline for [place] speakers.  ? 1-346.980.7358. This is a hotline for TTY users.  ? 6-281-6-U-IZAIAH (1-609.942.5610). This is a hotline for lesbian, cabrera, bisexual, transgender, or questioning youth.  ? For a list of hotlines in [place], visit www.suicide.org/hotlines/international/eaaxdg-qxzbyrl-cwjgxdyl.html  · Contact a crisis center or a local suicide prevention center. To find a crisis center or suicide prevention center:  ? Call your local hospital, clinic, community service organization, mental health center, social service provider, or health department. Ask for help with connecting to a crisis center.  ? For a list of crisis centers in the [place], visit:  suicidepreventionlifeline.org  ? For a list of crisis centers in [place], visit: suicideprevention.ca  How to help yourself feel better    · Promise yourself that you will not do anything extreme when you have suicidal feelings. Remember, there is hope. Many people have gotten through suicidal thoughts and feelings, and you can too. If you have had these feelings before, remind yourself that you can get through them again.  · Let family, friends, teachers, or counselors know how you are feeling. Try not to separate yourself from those who care about you and want to help you. Talk with someone every day, even if you do not feel sociable. Face-to-face conversation is best to help them understand your feelings.  · Contact a mental health care provider and work with this person regularly.  · Make a safety plan that you can follow during a crisis. Include phone numbers of suicide prevention hotlines, mental health professionals, and trusted friends and family members you can call during an emergency. Save these numbers on your phone.  · If you are thinking of taking a lot of medicine, give your medicine to someone who can give it to you as prescribed. If you are on antidepressants and are concerned you will overdose, tell your health care provider so that he or she can give you safer medicines.  · Try to stick to your routines. Follow a schedule every day. Make self-care a priority.  · Make a list of realistic goals, and cross them off when you achieve them. Accomplishments can give you a sense of worth.  · Wait until you are feeling better before doing things that you find difficult or unpleasant.  · Do things that you have always enjoyed to take your mind off your feelings. Try reading a book, or listening to or playing music. Spending time outside, in nature, may help you feel better.  Follow these instructions at home:    · Visit your primary health care provider every year for a checkup.  · Work with a mental health  care provider as needed.  · Eat a well-balanced diet, and eat regular meals.  · Get plenty of rest.  · Exercise if you are able. Just 30 minutes of exercise each day can help you feel better.  · Take over-the-counter and prescription medicines only as told by your health care provider. Ask your mental health care provider about the possible side effects of any medicines you are taking.  · Do not use alcohol or drugs, and remove these substances from your home.  · Remove weapons, poisons, knives, and other deadly items from your home.  General recommendations  · Keep your living space well lit.  · When you are feeling well, write yourself a letter with tips and support that you can read when you are not feeling well.  · Remember that life's difficulties can be sorted out with help. Conditions can be treated, and you can learn behaviors and ways of thinking that will help you.  Where to find more information  · National Suicide Prevention Lifeline: www.suicidepreventionlifeline.org  · Hopeline: www.hopeline.SitScape  · American Foundation for Suicide Prevention: www.afsp.org  · The Tone Project (for lesbian, cabrera, bisexual, transgender, or questioning youth): www.thetrevorproject.org  Contact a health care provider if:  · You feel as though you are a burden to others.  · You feel agitated, angry, vengeful, or have extreme mood swings.  · You have withdrawn from family and friends.  Get help right away if:  · You are talking about suicide or wishing to die.  · You start making plans for how to commit suicide.  · You feel that you have no reason to live.  · You start making plans for putting your affairs in order, saying goodbye, or giving your possessions away.  · You feel guilt, shame, or unbearable pain, and it seems like there is no way out.  · You are frequently using drugs or alcohol.  · You are engaging in risky behaviors that could lead to death.  If you have any of these symptoms, get help right away. Call  emergency services, go to your nearest emergency department or crisis center, or call a suicide crisis helpline.  Summary  · Suicide is when you take your own life.  · Promise yourself that you will not do anything extreme when you have suicidal feelings.  · Let family, friends, teachers, or counselors know how you are feeling.  · Get help right away if you feel as though life is getting too tough to handle and you are thinking about suicide.  This information is not intended to replace advice given to you by your health care provider. Make sure you discuss any questions you have with your health care provider.  Document Released: 06/23/2004 Document Revised: 04/09/2020 Document Reviewed: 07/31/2018  [employer] Patient Education © 2020 [employer]  Smoking Cessation, Tips for Success  If you are ready to quit smoking, congratulations! You have chosen to help yourself be healthier. Cigarettes bring nicotine, tar, carbon monoxide, and other irritants into your body. Your lungs, heart, and blood vessels will be able to work better without these poisons. There are many different ways to quit smoking. Nicotine gum, nicotine patches, a nicotine inhaler, or nicotine nasal spray can help with physical craving. Hypnosis, support groups, and medicines help break the habit of smoking.  WHAT THINGS CAN I DO TO MAKE QUITTING EASIER?   Here are some tips to help you quit for good:  · Pick a date when you will quit smoking completely. Tell all of your friends and family about your plan to quit on that date.  · Do not try to slowly cut down on the number of cigarettes you are smoking. Pick a quit date and quit smoking completely starting on that day.  · Throw away all cigarettes.    · Clean and remove all ashtrays from your home, work, and car.  · On a card, write down your reasons for quitting. Carry the card with you and read it when you get the urge to smoke.  · Cleanse your body of nicotine. Drink enough water and fluids to  "keep your urine clear or pale yellow. Do this after quitting to flush the nicotine from your body.  · Learn to predict your moods. Do not let a bad situation be your excuse to have a cigarette. Some situations in your life might tempt you into wanting a cigarette.  · Never have \"just one\" cigarette. It leads to wanting another and another. Remind yourself of your decision to quit.  · Change habits associated with smoking. If you smoked while driving or when feeling stressed, try other activities to replace smoking. Stand up when drinking your coffee. Brush your teeth after eating. Sit in a different chair when you read the paper. Avoid alcohol while trying to quit, and try to drink fewer caffeinated beverages. Alcohol and caffeine may urge you to smoke.  · Avoid foods and drinks that can trigger a desire to smoke, such as sugary or spicy foods and alcohol.  · Ask people who smoke not to smoke around you.  · Have something planned to do right after eating or having a cup of coffee. For example, plan to take a walk or exercise.  · Try a relaxation exercise to calm you down and decrease your stress. Remember, you may be tense and nervous for the first 2 weeks after you quit, but this will pass.  · Find new activities to keep your hands busy. Play with a pen, coin, or rubber band. Doodle or draw things on paper.  · Brush your teeth right after eating. This will help cut down on the craving for the taste of tobacco after meals. You can also try mouthwash.    · Use oral substitutes in place of cigarettes. Try using lemon drops, carrots, cinnamon sticks, or chewing gum. Keep them handy so they are available when you have the urge to smoke.  · When you have the urge to smoke, try deep breathing.  · Designate your home as a nonsmoking area.  · If you are a heavy smoker, ask your health care provider about a prescription for nicotine chewing gum. It can ease your withdrawal from nicotine.  · Reward yourself. Set aside the " "cigarette money you save and buy yourself something nice.  · Look for support from others. Join a support group or smoking cessation program. Ask someone at home or at work to help you with your plan to quit smoking.  · Always ask yourself, \"Do I need this cigarette or is this just a reflex?\" Tell yourself, \"Today, I choose not to smoke,\" or \"I do not want to smoke.\" You are reminding yourself of your decision to quit.  · Do not replace cigarette smoking with electronic cigarettes (commonly called e-cigarettes). The safety of e-cigarettes is unknown, and some may contain harmful chemicals.  · If you relapse, do not give up! Plan ahead and think about what you will do the next time you get the urge to smoke.  HOW WILL I FEEL WHEN I QUIT SMOKING?  You may have symptoms of withdrawal because your body is used to nicotine (the addictive substance in cigarettes). You may crave cigarettes, be irritable, feel very hungry, cough often, get headaches, or have difficulty concentrating. The withdrawal symptoms are only temporary. They are strongest when you first quit but will go away within 10-14 days. When withdrawal symptoms occur, stay in control. Think about your reasons for quitting. Remind yourself that these are signs that your body is healing and getting used to being without cigarettes. Remember that withdrawal symptoms are easier to treat than the major diseases that smoking can cause.   Even after the withdrawal is over, expect periodic urges to smoke. However, these cravings are generally short lived and will go away whether you smoke or not. Do not smoke!  WHAT RESOURCES ARE AVAILABLE TO HELP ME QUIT SMOKING?  Your health care provider can direct you to community resources or hospitals for support, which may include:  · Group support.  · Education.  · Hypnosis.  · Therapy.     This information is not intended to replace advice given to you by your health care provider. Make sure you discuss any questions you have " with your health care provider.     Document Released: 09/15/2005 Document Revised: 01/08/2016 Document Reviewed: 06/05/2014  [employer] Interactive Patient Education ©2016 [employer] Inc.

## 2022-02-16 NOTE — PROGRESS NOTES
Chief Complaint   Patient presents with   • Follow-Up     Follow up from behavioral health last month- discus smeds       Subjective:     HPI:   Caro Harding is a 52 y.o. female here to discuss the evaluation and management of:      Recurrent major depressive disorder (HCC)  Depression Screen (PHQ-2/PHQ-9) 10/20/2021 1/3/2022 2/16/2022   PHQ-2 Total Score - 4 -   PHQ-2 Total Score 0 - 3   PHQ-9 Total Score - 16 -   PHQ-9 Total Score - - 16       Interpretation of PHQ-9 Total Score   Score Severity   1-4 No Depression   5-9 Mild Depression   10-14 Moderate Depression   15-19 Moderately Severe Depression   20-27 Severe Depression    Patient admitted herself to [facility] on January 19 and was discharged on January 22, 2022.   During that time she was positive for suicidal ideations and was cutting.   They switch her medication from Paxil to Prozac on the January 20.    Today she continues to report that she has depression and anxiety symptoms however she denies any suicidal or homicidal thoughts.  She denies any suicidal plan.  She has not followed up with psychiatry referral.    Plan  Discussed coping skills.  Encourage patient to continue with Prozac.  Encourage patient to call and schedule appointment with [facility]      VANESSA (generalized anxiety disorder)  VANESSA 7 10/20/2021 1/3/2022 2/16/2022   VANESSA-7 Total Score 17 18 17       Interpretation of VANESSA 7 Total Score   Score Severity:  0-4 No Anxiety   5-9 Mild Anxiety  10-14 Moderate Anxiety  15-21 Severe Anxiety    See additional notes in major depressive disorder.    Subclinical hypothyroidism  Current condition.  Patient did not restart back on her levothyroxine 50 mcg daily.  Patient did not have labs done prior to appointment.    Patient continues to express that she has depression, constipation, fatigue, and moodiness.    Plan  Encourage patient start back on levothyroxine 50 mcg daily.  New prescription was sent to  pharmacy.  Patient was agreeable with this plan of care.  Patient will follow up in clinic with labs in 6 weeks and follow-up in 6 to 8 weeks.    Tobacco dependence due to cigarettes  Chronic ongoing health concern.  Patient reports that she previously smoking 2 packs/day until [facility] where they placed a nicotine patch on her and she quit smoking during that time.  She is back to smoking about a pack a day however she is interested in quitting smoking.    Plan  In-depth conversation with patient regards to smoking sensation.  Prescription of nicotine patch 21 mg was sent to pharmacy.   was given for approximately 3 to 4 minutes during our exam.          ROS:  Gen: no fevers/chills, no changes in weight  Pulm: no sob, no cough  CV: no chest pain, no palpitations  GI: Per HPI  Neuro: no headaches, no numbness/tingling  Heme/Lymph: no easy bruising    No Known Allergies    Current medicines (including changes today)  Current Outpatient Medications   Medication Sig Dispense Refill   • FLUoxetine (PROZAC) 20 MG Cap Take 20 mg by mouth every day.     • levothyroxine (SYNTHROID) 50 MCG Tab Take 1 Tablet by mouth every morning on an empty stomach. 90 Tablet 0   • nicotine (NICODERM) 21 MG/24HR PATCH 24 HR Place 1 Patch on the skin every 24 hours. 30 Patch 3   • paroxetine (PAXIL) 40 MG tablet Take 1 Tablet by mouth every day. (Patient not taking: Reported on 2022) 90 Tablet 1     No current facility-administered medications for this visit.       Social History     Tobacco Use   • Smoking status: Current Every Day Smoker     Packs/day: 1.00     Years: 30.00     Pack years: 30.00     Types: Cigarettes     Last attempt to quit: 2014     Years since quittin.1   • Smokeless tobacco: Never Used   Vaping Use   • Vaping Use: Never used   Substance Use Topics   • Alcohol use: No   • Drug use: Yes     Types: Marijuana       Patient Active Problem List    Diagnosis Date Noted   • VANESSA  "(generalized anxiety disorder) 10/20/2021   • Subclinical hypothyroidism 07/13/2020   • Chronic right shoulder pain 07/07/2020   • Cough 01/15/2020   • Arthralgia 01/08/2019   • Tobacco dependence due to cigarettes 01/07/2019   • Recurrent major depressive disorder (HCC) 01/07/2019       Family History   Problem Relation Age of Onset   • Hypertension Mother    • Arthritis Father    • Hyperlipidemia Father    • Hypertension Father    • Cancer Maternal Grandmother    • Psychiatric Illness Maternal Grandmother    • Diabetes Paternal Grandfather    • Heart Attack Paternal Grandfather    • Hyperlipidemia Paternal Grandfather    • Heart Disease Paternal Grandfather    • Stroke Maternal Grandfather    • Dementia Paternal Grandmother    • Cancer Paternal Grandmother           Objective:       /70   Pulse 73   Temp 36.1 °C (97 °F) (Temporal)   Resp 16   Ht 1.702 m (5' 7\")   Wt 82.6 kg (182 lb 3.2 oz)   SpO2 98%  Body mass index is 28.54 kg/m².    Physical Exam:  Constitutional: Well-developed and well-nourished female in NAD. Not diaphoretic. No distress.   Skin: warm, dry, intact, no evidence of rash or concerning lesions  Cardiovascular: Regular rate and rhythm without murmur. Radial pulses are intact and equal bilaterally.  Pulmonary: Clear to ausculation. Normal effort. No rales, ronchi, or wheezing.  Abdomen: Soft, non tender, and without distention. Active bowel sounds in all four quadrants.  Extremities: No cyanosis, clubbing, erythema, nor edema.   Neurological: Alert and oriented x 3. No cranial nerve deficit. 5/5 myotomes. Sensation intact.   Psychiatric:  Behavior, mood, and affect are appropriate.      Assessment and Plan:     The following treatment plan was discussed:  Reviewed [facility] records brought in by patient.    1. Subclinical hypothyroidism  - levothyroxine (SYNTHROID) 50 MCG Tab; Take 1 Tablet by mouth every morning on an empty stomach.  Dispense: 90 Tablet; Refill: 0    2. " Moderate episode of recurrent major depressive disorder (HCC)    3. Tobacco dependence due to cigarettes  - nicotine (NICODERM) 21 MG/24HR PATCH 24 HR; Place 1 Patch on the skin every 24 hours.  Dispense: 30 Patch; Refill: 3    4. VANESSA (generalized anxiety disorder)    Other orders  - FLUoxetine (PROZAC) 20 MG Cap; Take 20 mg by mouth every day.      Any change or worsening of signs or symptoms, patient encouraged to follow-up or report to emergency room for further evaluation. Patient verbalizes understanding and agrees.    Follow-Up: Return in about 7 weeks (around 4/6/2022) for THS labs, VANESSA and MDD.      PLEASE NOTE: This dictation was created using voice recognition software. I have made every reasonable attempt to correct obvious errors, but I expect that there are errors of grammar and possibly content that I did not discover before finalizing the note.

## 2022-02-16 NOTE — ASSESSMENT & PLAN NOTE
Current condition.  Patient did not restart back on her levothyroxine 50 mcg daily.  Patient did not have labs done prior to appointment.    Patient continues to express that she has depression, constipation, fatigue, and moodiness.    Plan  Encourage patient start back on levothyroxine 50 mcg daily.  New prescription was sent to pharmacy.  Patient was agreeable with this plan of care.  Patient will follow up in clinic with labs in 6 weeks and follow-up in 6 to 8 weeks.

## 2022-02-17 ENCOUNTER — PATIENT MESSAGE (OUTPATIENT)
Dept: MEDICAL GROUP | Facility: PHYSICIAN GROUP | Age: 52
End: 2022-02-17
Payer: COMMERCIAL

## 2022-02-17 DIAGNOSIS — F33.1 MODERATE EPISODE OF RECURRENT MAJOR DEPRESSIVE DISORDER (HCC): ICD-10-CM

## 2022-02-17 DIAGNOSIS — F41.1 GAD (GENERALIZED ANXIETY DISORDER): ICD-10-CM

## 2022-02-17 RX ORDER — FLUOXETINE HYDROCHLORIDE 20 MG/1
20 CAPSULE ORAL DAILY
Qty: 90 CAPSULE | Refills: 3 | Status: SHIPPED | OUTPATIENT
Start: 2022-02-17 | End: 2022-04-07

## 2022-04-07 ENCOUNTER — OFFICE VISIT (OUTPATIENT)
Dept: MEDICAL GROUP | Facility: PHYSICIAN GROUP | Age: 52
End: 2022-04-07
Payer: COMMERCIAL

## 2022-04-07 ENCOUNTER — HOSPITAL ENCOUNTER (OUTPATIENT)
Dept: LAB | Facility: MEDICAL CENTER | Age: 52
End: 2022-04-07
Attending: NURSE PRACTITIONER
Payer: COMMERCIAL

## 2022-04-07 VITALS
OXYGEN SATURATION: 96 % | TEMPERATURE: 96.3 F | BODY MASS INDEX: 29 KG/M2 | DIASTOLIC BLOOD PRESSURE: 70 MMHG | HEIGHT: 67 IN | SYSTOLIC BLOOD PRESSURE: 110 MMHG | RESPIRATION RATE: 18 BRPM | WEIGHT: 184.8 LBS | HEART RATE: 76 BPM

## 2022-04-07 DIAGNOSIS — R19.8 IRREGULAR BOWEL HABITS: ICD-10-CM

## 2022-04-07 DIAGNOSIS — F33.1 MODERATE EPISODE OF RECURRENT MAJOR DEPRESSIVE DISORDER (HCC): ICD-10-CM

## 2022-04-07 DIAGNOSIS — E03.8 SUBCLINICAL HYPOTHYROIDISM: ICD-10-CM

## 2022-04-07 DIAGNOSIS — F41.1 GAD (GENERALIZED ANXIETY DISORDER): ICD-10-CM

## 2022-04-07 DIAGNOSIS — F51.04 PSYCHOPHYSIOLOGICAL INSOMNIA: ICD-10-CM

## 2022-04-07 DIAGNOSIS — F17.210 TOBACCO DEPENDENCE DUE TO CIGARETTES: ICD-10-CM

## 2022-04-07 PROCEDURE — 84480 ASSAY TRIIODOTHYRONINE (T3): CPT

## 2022-04-07 PROCEDURE — 36415 COLL VENOUS BLD VENIPUNCTURE: CPT

## 2022-04-07 PROCEDURE — 99214 OFFICE O/P EST MOD 30 MIN: CPT | Performed by: NURSE PRACTITIONER

## 2022-04-07 PROCEDURE — 84439 ASSAY OF FREE THYROXINE: CPT

## 2022-04-07 PROCEDURE — 84443 ASSAY THYROID STIM HORMONE: CPT

## 2022-04-07 RX ORDER — FLUOXETINE HYDROCHLORIDE 40 MG/1
40 CAPSULE ORAL DAILY
Qty: 90 CAPSULE | Refills: 3 | Status: SHIPPED | OUTPATIENT
Start: 2022-04-07 | End: 2022-09-08

## 2022-04-07 ASSESSMENT — FIBROSIS 4 INDEX: FIB4 SCORE: 0.61

## 2022-04-07 ASSESSMENT — ANXIETY QUESTIONNAIRES
5. BEING SO RESTLESS THAT IT IS HARD TO SIT STILL: SEVERAL DAYS
4. TROUBLE RELAXING: SEVERAL DAYS
6. BECOMING EASILY ANNOYED OR IRRITABLE: NEARLY EVERY DAY
2. NOT BEING ABLE TO STOP OR CONTROL WORRYING: MORE THAN HALF THE DAYS
GAD7 TOTAL SCORE: 13
1. FEELING NERVOUS, ANXIOUS, OR ON EDGE: MORE THAN HALF THE DAYS
3. WORRYING TOO MUCH ABOUT DIFFERENT THINGS: MORE THAN HALF THE DAYS
7. FEELING AFRAID AS IF SOMETHING AWFUL MIGHT HAPPEN: MORE THAN HALF THE DAYS

## 2022-04-07 ASSESSMENT — PATIENT HEALTH QUESTIONNAIRE - PHQ9
SUM OF ALL RESPONSES TO PHQ QUESTIONS 1-9: 17
CLINICAL INTERPRETATION OF PHQ2 SCORE: 4
5. POOR APPETITE OR OVEREATING: 3 - NEARLY EVERY DAY

## 2022-04-07 NOTE — ASSESSMENT & PLAN NOTE
Chronic condition with exacerbation.  Patient reports that they were giving her trazodone when she was hospitalized due to depression anxiety.  She reports she did not like the way that trazodone made her feel in the morning.  She does not recall the milligram strength.    Today she is reporting that she is having trouble staying asleep and is sometimes only getting 3 to 4 hours of sleep per night.  She does have continued marital hardship stress, anxiety, and depression symptoms.    She does report using her phone in bed and when she wakes up in the middle of the night.  She indicates that she cannot get her mind to shut off.    Plan  In-depth conversation with patient in regards to appropriate sleep hygiene including no caffeine after 3 PM, bedroom is only for sex and sleep, no phone after 7 8 PM at night with social media, no phone at night if she wakes up in the middle of the night, encouraged journaling.  In-depth discussion about restarting trazodone however patient declined at this time.  Patient will start trial of melatonin 3 to 5 mg dissolvable tablet 30 minutes prior to bed with a sleepy time tea with valerian root.  Encouraged meditation, mindfulness, and journaling.  Encourage more daily exercise.

## 2022-04-07 NOTE — PROGRESS NOTES
Chief Complaint   Patient presents with   • Follow-Up     Anxiety, depression, tsh labs not done       Subjective:     HPI:   Caro Harding is a 52 y.o. female here to discuss the evaluation and management of:      VANESSA (generalized anxiety disorder)  VANESSA 7 1/3/2022 2/16/2022 4/7/2022   VANESSA-7 Total Score 18 17 13       Interpretation of VANESSA 7 Total Score   Score Severity:  0-4 No Anxiety   5-9 Mild Anxiety  10-14 Moderate Anxiety  15-21 Severe Anxiety    Please see additional notes in MDD.    Recurrent major depressive disorder (HCC)  Depression Screen (PHQ-2/PHQ-9) 1/3/2022 2/16/2022 4/7/2022   PHQ-2 Total Score 4 - -   PHQ-2 Total Score - 3 4   PHQ-9 Total Score 16 - -   PHQ-9 Total Score - 16 17       Interpretation of PHQ-9 Total Score   Score Severity   1-4 No Depression   5-9 Mild Depression   10-14 Moderate Depression   15-19 Moderately Severe Depression   20-27 Severe Depression    Is a chronic ongoing condition.  Patient is currently on wait list to get in with psychiatry.  She denies any suicidal or homicidal ideations or self-harm.  She is currently taking Prozac 20 mg tablet daily however feels this is an adequate in controlling her symptoms.  She reports that she continues to have depression and anxiety symptoms.  She does have marital hardship.   She is having insomnia symptoms with trouble staying asleep.  Please see additional notes and insomnia.    Plan  Increase Prozac to 40 mg daily  Encourage patient to contact [facility] to see if they can get her in sooner.  New referral was placed for urgent referral to behavioral health to try to get her in sooner.  Discussed risk of suicidal ideation and patient agrees to to reach out to suicide hotline, call 911, or go to emergency department or behavioral health if suicidal ideations arise.  Discussed coping skills encourage patient to increase exercise.        Subclinical hypothyroidism  Patient did not get labs done prior to appointment.   She will get them completed this morning.  She is currently taking levothyroxine 50 mcg daily.  She denies any adverse effects of this medication.    Plan  Patient will obtain labs today.    Tobacco dependence due to cigarettes  Chronic condition.  Patient does continue to smoke cigarettes.  She is aware the risk associated with continuing to smoke.    Plan  Encourage patient to quit smoking.    Psychophysiological insomnia  Chronic condition with exacerbation.  Patient reports that they were giving her trazodone when she was hospitalized due to depression anxiety.  She reports she did not like the way that trazodone made her feel in the morning.  She does not recall the milligram strength.    Today she is reporting that she is having trouble staying asleep and is sometimes only getting 3 to 4 hours of sleep per night.  She does have continued marital hardship stress, anxiety, and depression symptoms.    She does report using her phone in bed and when she wakes up in the middle of the night.  She indicates that she cannot get her mind to shut off.    Plan  In-depth conversation with patient in regards to appropriate sleep hygiene including no caffeine after 3 PM, bedroom is only for sex and sleep, no phone after 7 8 PM at night with social media, no phone at night if she wakes up in the middle of the night, encouraged journaling.  In-depth discussion about restarting trazodone however patient declined at this time.  Patient will start trial of melatonin 3 to 5 mg dissolvable tablet 30 minutes prior to bed with a sleepy time tea with valerian root.  Encouraged meditation, mindfulness, and journaling.  Encourage more daily exercise.    Irregular bowel habits  Is a newly assessed condition.  Patient reports that she has had chronic irregular bowel habits.  She reports she only has a bowel movement once per week but never feels completely empty.  She reports that her stools are soft/pudding-like in nature, normal brown  "in color, and are nonpainful.  She denies any nausea, vomiting, diarrhea, or dark tarry stools.    She is positive for abdominal bloating.    She negates that her last colonoscopy was in 2018.    Last Pap was in 2018 and was normal.  She does not regularly exercise or eat a high-fiber diet.  She does eat yogurt.    Plan  In-depth conversation with patient in regards to healthy diet lifestyle modifications including incorporating probiotics, walking 150 minutes/week, and increasing fiber in her diet.  Encourage patient to schedule Pap.  Increase water to 64 ounces per day.  Handouts were provided in discharge instructions.          ROS:  Gen: no fevers/chills, no changes in weight  Pulm: no sob, no cough  CV: no chest pain, no palpitations  GI: Per HPI  Neuro: no headaches, no numbness/tingling  Psych: Positive per HPI    No Known Allergies    Current medicines (including changes today)  Current Outpatient Medications   Medication Sig Dispense Refill   • fluoxetine (PROZAC) 40 MG capsule Take 1 Capsule by mouth every day. 90 Capsule 3   • levothyroxine (SYNTHROID) 50 MCG Tab Take 1 Tablet by mouth every morning on an empty stomach. 90 Tablet 0   • nicotine (NICODERM) 21 MG/24HR PATCH 24 HR Place 1 Patch on the skin every 24 hours. 30 Patch 3     No current facility-administered medications for this visit.          Objective:       /70   Pulse 76   Temp (!) 35.7 °C (96.3 °F) (Temporal)   Resp 18   Ht 1.702 m (5' 7\")   Wt 83.8 kg (184 lb 12.8 oz)   SpO2 96%  Body mass index is 28.94 kg/m².    Physical Exam:  Constitutional: Well-developed and well-nourished female in NAD. Not diaphoretic. No distress.   Skin: warm, dry, intact  Cardiovascular: Regular rate and rhythm without murmur. Radial pulses are intact and equal bilaterally.  Pulmonary: Clear to ausculation. Normal effort. No rales, ronchi, or wheezing.  Abdomen: Soft, non tender, and without distention. Active bowel sounds in all four quadrants. "   Extremities: No cyanosis, clubbing, erythema, nor edema.   Neurological: Alert and oriented x 3.   Psychiatric:  Behavior, mood, and affect are appropriate.    Assessment and Plan:     The following treatment plan was discussed:  Records request was sent for previous colonoscopy results.  Discussed need for vaccines including COVID vaccine, pneumonia vaccine, and zoster vaccine.  Patient is declined vaccines today.    1. Tobacco dependence due to cigarettes    2. Psychophysiological insomnia  - fluoxetine (PROZAC) 40 MG capsule; Take 1 Capsule by mouth every day.  Dispense: 90 Capsule; Refill: 3  - Referral to Behavioral Health    3. VANESSA (generalized anxiety disorder)  - fluoxetine (PROZAC) 40 MG capsule; Take 1 Capsule by mouth every day.  Dispense: 90 Capsule; Refill: 3  - Referral to Behavioral Health    4. Moderate episode of recurrent major depressive disorder (HCC)  - fluoxetine (PROZAC) 40 MG capsule; Take 1 Capsule by mouth every day.  Dispense: 90 Capsule; Refill: 3  - Referral to Behavioral Health    5. Irregular bowel habits    6. Subclinical hypothyroidism      Any change or worsening of signs or symptoms, patient encouraged to follow-up or report to emergency room for further evaluation. Patient verbalizes understanding and agrees.    Follow-Up: Return in about 4 weeks (around 5/5/2022) for Soft stools, VANESSA and MDD--Needs PAP anytime.      PLEASE NOTE: This dictation was created using voice recognition software. I have made every reasonable attempt to correct obvious errors, but I expect that there are errors of grammar and possibly content that I did not discover before finalizing the note.

## 2022-04-07 NOTE — ASSESSMENT & PLAN NOTE
Is a newly assessed condition.  Patient reports that she has had chronic irregular bowel habits.  She reports she only has a bowel movement once per week but never feels completely empty.  She reports that her stools are soft/pudding-like in nature, normal brown in color, and are nonpainful.  She denies any nausea, vomiting, diarrhea, or dark tarry stools.    She is positive for abdominal bloating.    She negates that her last colonoscopy was in 2018.    Last Pap was in 2018 and was normal.  She does not regularly exercise or eat a high-fiber diet.  She does eat yogurt.    Plan  In-depth conversation with patient in regards to healthy diet lifestyle modifications including incorporating probiotics, walking 150 minutes/week, and increasing fiber in her diet.  Encourage patient to schedule Pap.  Increase water to 64 ounces per day.  Handouts were provided in discharge instructions.

## 2022-04-07 NOTE — ASSESSMENT & PLAN NOTE
Depression Screen (PHQ-2/PHQ-9) 1/3/2022 2/16/2022 4/7/2022   PHQ-2 Total Score 4 - -   PHQ-2 Total Score - 3 4   PHQ-9 Total Score 16 - -   PHQ-9 Total Score - 16 17       Interpretation of PHQ-9 Total Score   Score Severity   1-4 No Depression   5-9 Mild Depression   10-14 Moderate Depression   15-19 Moderately Severe Depression   20-27 Severe Depression    Is a chronic ongoing condition.  Patient is currently on wait list to get in with psychiatry.  She denies any suicidal or homicidal ideations or self-harm.  She is currently taking Prozac 20 mg tablet daily however feels this is an adequate in controlling her symptoms.  She reports that she continues to have depression and anxiety symptoms.  She does have marital hardship.   She is having insomnia symptoms with trouble staying asleep.  Please see additional notes and insomnia.    Plan  Increase Prozac to 40 mg daily  Encourage patient to contact [facility] to see if they can get her in sooner.  New referral was placed for urgent referral to behavioral health to try to get her in sooner.  Discussed risk of suicidal ideation and patient agrees to to reach out to suicide hotline, call 911, or go to emergency department or behavioral health if suicidal ideations arise.  Discussed coping skills encourage patient to increase exercise.

## 2022-04-07 NOTE — PATIENT INSTRUCTIONS
"Behavioral Health     [facility]  [address] [place]  [place] [address]  Phone: [phone]  Fax: [phone]    · Emphasized importance of maintaining good sleep hygiene including: no caffeine after 3pm, no napping, using bedroom only for sleep or sex, no TV or phones before bed. Trial of melatonin 3-5 mg dissolvable tab and \"sleepy time tea\".   · Try doing journaling he cannot sleep after waking up at night.  Do not get on your phone for Facebook or instrument.  · Start probiotic       Fiber Content in Foods    See the following list for the dietary fiber content of some common foods.  High-fiber foods  High-fiber foods contain 4 grams or more (4g or more) of fiber per serving. They include:  · Artichoke (fresh) -- 1 medium has 10.3g of fiber.  · Baked beans, plain or vegetarian (canned) -- ½ cup has 5.2g of fiber.  · Blackberries or raspberries (fresh) -- ½ cup has 4g of fiber.  · Bran cereal -- ½ cup has 8.6g of fiber.  · Bulgur (cooked) -- ½ cup has 4g of fiber.  · Kidney beans (canned) -- ½ cup has 6.8g of fiber.  · Lentils (cooked) -- ½ cup has 7.8g of fiber.  · Pear (fresh) -- 1 medium has 5.1g of fiber.  · Peas (frozen) -- ½ cup has 4.4g of fiber.  · Baca beans (canned) -- ½ cup has 5.5g of fiber.  · Baca beans (dried and cooked) -- ½ cup has 7.7g of fiber.  · Potato with skin (baked) -- 1 medium has 4.4g of fiber.  · Quinoa (cooked) -- ½ cup has 5g of fiber.  · Soybeans (canned, frozen, or fresh) -- ½ cup has 5.1g of fiber.  Moderate-fiber foods  Moderate-fiber foods contain 1-4 grams (1-4g) of fiber per serving. They include:  · Almonds -- 1 oz. has 3.5g of fiber.  · Apple with skin -- 1 medium has 3.3g of fiber.  · Applesauce, sweetened -- ½ cup has 1.5g of fiber.  · Bagel, plain -- one 4-inch (10-cm) bagel has 2g of fiber.  · Banana -- 1 medium has 3.1g of fiber.  · Broccoli (cooked) -- ½ cup has 2.5g of fiber.  · Carrots (cooked) -- ½ cup has 2.3g of " fiber.  · Corn (canned or frozen) -- ½ cup has 2.1g of fiber.  · Corn tortilla -- one 6-inch (15-cm) tortilla has 1.5g of fiber.  · Green beans (canned) -- ½ cup has 2g of fiber.  · Instant oatmeal -- ½ cup has about 2g of fiber.  · Long-grain brown rice (cooked) -- 1 cup has 3.5g of fiber.  · Macaroni, enriched (cooked) -- 1 cup has 2.5g of fiber.  · Melon -- 1 cup has 1.4g of fiber.  · Multigrain cereal -- ½ cup has about 2-4g of fiber.  · Orange -- 1 small has 3.1g of fiber.  · Potatoes, mashed -- ½ cup has 1.6g of fiber.  · Raisins -- 1/4 cup has 1.6g of fiber.  · Squash -- ½ cup has 2.9g of fiber.  · Sunflower seeds -- ¼ cup has 1.1g of fiber.  · Tomato -- 1 medium has 1.5g of fiber.  · Vegetable or soy eliud -- 1 has 3.4g of fiber.  · Whole-wheat bread -- 1 slice has 2g of fiber.  · Whole-wheat spaghetti -- ½ cup has 3.2g of fiber.  Low-fiber foods  Low-fiber foods contain less than 1 gram (less than 1g) of fiber per serving. They include:  · Egg -- 1 large.  · Flour tortilla -- one 6-inch (15-cm) tortilla.  · Fruit juice -- ½ cup.  · Lettuce -- 1 cup.  · Meat, poultry, or fish -- 1 oz.  · Milk -- 1 cup.  · Spinach (raw) -- 1 cup.  · White bread -- 1 slice.  · White rice -- ½ cup.  · Yogurt -- ¾ cup.  Actual amounts of fiber in foods may be different depending on processing. Talk with your dietitian about how much fiber you need in your diet.  This information is not intended to replace advice given to you by your health care provider. Make sure you discuss any questions you have with your health care provider.  Document Released: 05/05/2008 Document Revised: 08/10/2017 Document Reviewed: 02/09/2017  [employer] Patient Education © 2020 [employer]

## 2022-04-07 NOTE — LETTER
[facility]  HARPAL Bravo  [address] [place] [address]  Fax: [phone]   Authorization for Release/Disclosure of   Protected Health Information   Name: JAS SINCLAIR : 1970 SSN: [id number]   Address: [address] [place] [address] [address] [place] [address] Phone:    [phone] (home)    I authorize the entity listed below to release/disclose the PHI below to:   [facility]/HARPAL Bravo and HARPAL Bravo   Provider or Entity Name:     Address   City, State, Zip   Phone:      Fax:     Reason for request: continuity of care   Information to be released:    [  ] LAST COLONOSCOPY,  including any PATH REPORT and follow-up  [  ] LAST FIT/COLOGUARD RESULT [  ] LAST DEXA  [  ] LAST MAMMOGRAM  [  ] LAST PAP  [  ] LAST LABS [  ] RETINA EXAM REPORT  [  ] IMMUNIZATION RECORDS  [  ] Release all info      [  ] Check here and initial the line next to each item to release ALL health information INCLUDING  _____ Care and treatment for drug and / or alcohol abuse  _____ HIV testing, infection status, or AIDS  _____ Genetic Testing    DATES OF SERVICE OR TIME PERIOD TO BE DISCLOSED: _____________  I understand and acknowledge that:  * This Authorization may be revoked at any time by you in writing, except if your health information has already been used or disclosed.  * Your health information that will be used or disclosed as a result of you signing this authorization could be re-disclosed by the recipient. If this occurs, your re-disclosed health information may no longer be protected by State or Federal laws.  * You may refuse to sign this Authorization. Your refusal will not affect your ability to obtain treatment.  * This Authorization becomes effective upon signing and will  on (date) __________.      If no date is indicated, this Authorization will  one (1) year from the signature date.    Name: Jas Sinclair    Signature:   Date:     2022        PLEASE FAX REQUESTED RECORDS BACK TO: [phone]

## 2022-04-07 NOTE — ASSESSMENT & PLAN NOTE
VANESSA 7 1/3/2022 2/16/2022 4/7/2022   VANESSA-7 Total Score 18 17 13       Interpretation of VANESSA 7 Total Score   Score Severity:  0-4 No Anxiety   5-9 Mild Anxiety  10-14 Moderate Anxiety  15-21 Severe Anxiety    Please see additional notes in MDD.

## 2022-04-07 NOTE — ASSESSMENT & PLAN NOTE
Chronic condition.  Patient does continue to smoke cigarettes.  She is aware the risk associated with continuing to smoke.    Plan  Encourage patient to quit smoking.

## 2022-04-07 NOTE — ASSESSMENT & PLAN NOTE
Patient did not get labs done prior to appointment.  She will get them completed this morning.  She is currently taking levothyroxine 50 mcg daily.  She denies any adverse effects of this medication.    Plan  Patient will obtain labs today.

## 2022-04-08 LAB
T3 SERPL-MCNC: 106 NG/DL (ref 60–181)
T4 FREE SERPL-MCNC: 1.13 NG/DL (ref 0.93–1.7)
TSH SERPL DL<=0.005 MIU/L-ACNC: 4.79 UIU/ML (ref 0.38–5.33)

## 2022-05-11 ENCOUNTER — OFFICE VISIT (OUTPATIENT)
Dept: MEDICAL GROUP | Facility: PHYSICIAN GROUP | Age: 52
End: 2022-05-11
Payer: COMMERCIAL

## 2022-05-11 VITALS
OXYGEN SATURATION: 93 % | TEMPERATURE: 97.1 F | HEART RATE: 73 BPM | BODY MASS INDEX: 29.89 KG/M2 | WEIGHT: 186 LBS | DIASTOLIC BLOOD PRESSURE: 78 MMHG | HEIGHT: 66 IN | SYSTOLIC BLOOD PRESSURE: 112 MMHG | RESPIRATION RATE: 14 BRPM

## 2022-05-11 DIAGNOSIS — F17.210 TOBACCO DEPENDENCE DUE TO CIGARETTES: ICD-10-CM

## 2022-05-11 DIAGNOSIS — F41.1 GAD (GENERALIZED ANXIETY DISORDER): ICD-10-CM

## 2022-05-11 DIAGNOSIS — R19.8 IRREGULAR BOWEL HABITS: ICD-10-CM

## 2022-05-11 DIAGNOSIS — F33.1 MODERATE EPISODE OF RECURRENT MAJOR DEPRESSIVE DISORDER (HCC): ICD-10-CM

## 2022-05-11 DIAGNOSIS — E66.9 OBESITY (BMI 30-39.9): ICD-10-CM

## 2022-05-11 PROCEDURE — 99214 OFFICE O/P EST MOD 30 MIN: CPT | Performed by: NURSE PRACTITIONER

## 2022-05-11 RX ORDER — PROPRANOLOL HYDROCHLORIDE 10 MG/1
10 TABLET ORAL 3 TIMES DAILY PRN
Qty: 60 TABLET | Refills: 0 | Status: SHIPPED | OUTPATIENT
Start: 2022-05-11 | End: 2022-09-28

## 2022-05-11 ASSESSMENT — PATIENT HEALTH QUESTIONNAIRE - PHQ9
5. POOR APPETITE OR OVEREATING: 3 - NEARLY EVERY DAY
SUM OF ALL RESPONSES TO PHQ QUESTIONS 1-9: 20
CLINICAL INTERPRETATION OF PHQ2 SCORE: 5

## 2022-05-11 ASSESSMENT — FIBROSIS 4 INDEX: FIB4 SCORE: 0.61

## 2022-05-11 ASSESSMENT — ANXIETY QUESTIONNAIRES
6. BECOMING EASILY ANNOYED OR IRRITABLE: NEARLY EVERY DAY
2. NOT BEING ABLE TO STOP OR CONTROL WORRYING: NEARLY EVERY DAY
3. WORRYING TOO MUCH ABOUT DIFFERENT THINGS: NEARLY EVERY DAY
1. FEELING NERVOUS, ANXIOUS, OR ON EDGE: MORE THAN HALF THE DAYS
5. BEING SO RESTLESS THAT IT IS HARD TO SIT STILL: NOT AT ALL
7. FEELING AFRAID AS IF SOMETHING AWFUL MIGHT HAPPEN: NEARLY EVERY DAY
GAD7 TOTAL SCORE: 17
4. TROUBLE RELAXING: NEARLY EVERY DAY

## 2022-05-11 NOTE — ASSESSMENT & PLAN NOTE
Depression Screen (PHQ-2/PHQ-9) 2/16/2022 4/7/2022 5/11/2022   PHQ-2 Total Score - - -   PHQ-2 Total Score 3 4 5   PHQ-9 Total Score - - -   PHQ-9 Total Score 16 17 20       Interpretation of PHQ-9 Total Score   Score Severity   1-4 No Depression   5-9 Mild Depression   10-14 Moderate Depression   15-19 Moderately Severe Depression   20-27 Severe Depression    Chronic and ongoing health concern.  Patient continues to express that she has depression and anxiety symptoms.  She is currently taking Prozac 40 mg daily and has seen improvement since increasing it from 20 mg daily the previous month.  She does continue to express that her depression and anxiety symptoms stemming from marital hardship and ongoing stress within her personal life.  She does continue to go to counseling.    She reports she continues to be on a wait list for psychiatry.  Contact information to new behavioral health referral was placed and contact information and patient was instructed to call today to schedule an appointment.    She is positive for suicidal ideations a few days over the past 2 weeks however she denies a plan today and reports that she will reach out to the suicide hotline or go to the emergency department if she starts developing a plan.    She continues to have issues with insomnia however has improved since increasing Prozac.

## 2022-05-11 NOTE — ASSESSMENT & PLAN NOTE
Chronic and ongoing condition.  Patient continues to report that she has irregular bowel habits with intermittent soft stools and constipation.  She continues to describe her bowel movements as soft/pudding-like in nature, normal brown in color and nonpainful.  She denies any nausea, vomiting, diarrhea, or dark tarry stools.  She is positive for intermittent abdominal bloating.    She does report that her symptoms are worse when eating fatty foods.

## 2022-05-11 NOTE — PROGRESS NOTES
Chief Complaint   Patient presents with   • Follow-Up     Anxiety, depression       Subjective:     HPI:   Caro Harding is a 52 y.o. female here to discuss the evaluation and management of:      VANESSA (generalized anxiety disorder)  VANESSA 7 2/16/2022 4/7/2022 5/11/2022   VANESSA-7 Total Score 17 13 17       Interpretation of VANESSA 7 Total Score   Score Severity:  0-4 No Anxiety   5-9 Mild Anxiety  10-14 Moderate Anxiety  15-21 Severe Anxiety  Please see additional notes in MDD.  Overall symptoms are improving with increase in Prozac.  Patient was started today on propanolol.    Recurrent major depressive disorder (HCC)  Depression Screen (PHQ-2/PHQ-9) 2/16/2022 4/7/2022 5/11/2022   PHQ-2 Total Score - - -   PHQ-2 Total Score 3 4 5   PHQ-9 Total Score - - -   PHQ-9 Total Score 16 17 20       Interpretation of PHQ-9 Total Score   Score Severity   1-4 No Depression   5-9 Mild Depression   10-14 Moderate Depression   15-19 Moderately Severe Depression   20-27 Severe Depression    Chronic and ongoing health concern.  Patient continues to express that she has depression and anxiety symptoms.  She is currently taking Prozac 40 mg daily and has seen improvement since increasing it from 20 mg daily the previous month.  She does continue to express that her depression and anxiety symptoms stemming from marital hardship and ongoing stress within her personal life.  She does continue to go to counseling.    She reports she continues to be on a wait list for psychiatry.  Contact information to new behavioral health referral was placed and contact information and patient was instructed to call today to schedule an appointment.    She is positive for suicidal ideations a few days over the past 2 weeks however she denies a plan today and reports that she will reach out to the suicide hotline or go to the emergency department if she starts developing a plan.    She continues to have issues with insomnia however has improved since  "increasing Prozac.    Tobacco dependence due to cigarettes  Chronic and ongoing health concern.  Encourage patient to continue to work on decreasing smoking.  Patient does continue to smoke cigarettes and is aware the risk associated in doing so.    Irregular bowel habits  Chronic and ongoing condition.  Patient continues to report that she has irregular bowel habits with intermittent soft stools and constipation.  She continues to describe her bowel movements as soft/pudding-like in nature, normal brown in color and nonpainful.  She denies any nausea, vomiting, diarrhea, or dark tarry stools.  She is positive for intermittent abdominal bloating.    She does report that her symptoms are worse when eating fatty foods.        ROS  Gen: no fevers/chills, no changes in weight  Pulm: no sob, no cough  CV: no chest pain, no palpitations  GI: Positive per HPI  MSk: no myalgias  Neuro: no headaches, no numbness/tingling  Psych: Positive per HPI      No Known Allergies    Current medicines (including changes today)  Current Outpatient Medications   Medication Sig Dispense Refill   • propranolol (INDERAL) 10 MG Tab Take 1 Tablet by mouth 3 times a day as needed (Anxiety). 60 Tablet 0   • fluoxetine (PROZAC) 40 MG capsule Take 1 Capsule by mouth every day. 90 Capsule 3   • levothyroxine (SYNTHROID) 50 MCG Tab Take 1 Tablet by mouth every morning on an empty stomach. 90 Tablet 0     No current facility-administered medications for this visit.          Objective:       /78   Pulse 73   Temp 36.2 °C (97.1 °F) (Temporal)   Resp 14   Ht 1.676 m (5' 6\")   Wt 84.4 kg (186 lb)   SpO2 93%  Body mass index is 30.02 kg/m².    Physical Exam:  Constitutional: Well-developed and well-nourished female in NAD. Not diaphoretic. No distress.   Skin: warm, dry, intact  Cardiovascular: Regular rate and rhythm without murmur. Radial pulses are intact and equal bilaterally.  Pulmonary: Clear to ausculation. Normal effort. No rales, " ronchi, or wheezing.  Abdomen: Soft, non tender, and without distention. Active bowel sounds in all four quadrants.   Extremities: No cyanosis, clubbing, erythema, nor edema.   Neurological: Alert and oriented x 3.   Psychiatric: Behavior appropriate, mood depressed, mildly tearful.      Assessment and Plan:     The following treatment plan was discussed:  • Metamucil daily to help with your irregular bowels.  • Please  a probiotic and start taking daily.  • Ensure that you are drinking at least 64 ounces of water per day.  • Continue on your Prozac at 40 mg daily.  • Continue working on coping skills and distressing including spending time with family and walking.  • Please call behavioral health number above for psychiatry appointment.  • Strict ER precautions if suicidal ideations and plans continue or worsen.  • Discharge handouts were provided about suicidal ideations, living with depression, Metamucil, and low FODMAP diet    1. Moderate episode of recurrent major depressive disorder (HCC)  - Patient has been identified as having a positive depression screening. Appropriate orders and counseling have been given.    2. VANESSA (generalized anxiety disorder)  - Patient has been identified as having a positive depression screening. Appropriate orders and counseling have been given.  - propranolol (INDERAL) 10 MG Tab; Take 1 Tablet by mouth 3 times a day as needed (Anxiety).  Dispense: 60 Tablet; Refill: 0    3. Tobacco dependence due to cigarettes    4. Obesity (BMI 30-39.9)  - Patient identified as having weight management issue.  Appropriate orders and counseling given.    5. Irregular bowel habits      Any change or worsening of signs or symptoms, patient encouraged to follow-up or report to urgent care or emergency room for further evaluation. Patient verbalizes understanding and agrees.    Follow-Up: Return in about 2 weeks (around 5/25/2022) for Pap, 4 week for MDD and VANESSA.      PLEASE NOTE: This  dictation was created using voice recognition software. I have made every reasonable attempt to correct obvious errors, but I expect that there are errors of grammar and possibly content that I did not discover before finalizing the note.

## 2022-05-11 NOTE — ASSESSMENT & PLAN NOTE
Chronic and ongoing health concern.  Encourage patient to continue to work on decreasing smoking.  Patient does continue to smoke cigarettes and is aware the risk associated in doing so.

## 2022-05-11 NOTE — ASSESSMENT & PLAN NOTE
VANESSA 7 2/16/2022 4/7/2022 5/11/2022   VANESSA-7 Total Score 17 13 17       Interpretation of VANESSA 7 Total Score   Score Severity:  0-4 No Anxiety   5-9 Mild Anxiety  10-14 Moderate Anxiety  15-21 Severe Anxiety  Please see additional notes in MDD.  Overall symptoms are improving with increase in Prozac.  Patient was started today on propanolol.

## 2022-05-11 NOTE — PATIENT INSTRUCTIONS
Behavioral Health     Behavioral Health Outpatient  [address] [place] [address] [address]  [place] [address]  Phone: [phone]  Fax: [phone]    Metamucil daily to help with your irregular bowels.  Please  a probiotic and start taking daily.  Ensure that you are drinking at least 64 ounces of water per day.  Continue on your Prozac at 40 mg daily.  Continue working on coping skills and distressing including spending time with family and walking.  Please call behavioral health number above for psychiatry appointment.  Strict ER precautions if suicidal ideations and plans continue or worsen.

## 2022-05-17 DIAGNOSIS — E03.8 SUBCLINICAL HYPOTHYROIDISM: ICD-10-CM

## 2022-05-17 RX ORDER — LEVOTHYROXINE SODIUM 0.05 MG/1
50 TABLET ORAL
Qty: 90 TABLET | Refills: 3 | Status: SHIPPED | OUTPATIENT
Start: 2022-05-17

## 2022-05-17 NOTE — TELEPHONE ENCOUNTER
Received request via: Pharmacy    Was the patient seen in the last year in this department? Yes    Does the patient have an active prescription (recently filled or refills available) for medication(s) requested? No       Last Office Visit:05/11/2022  Last Labs:04/07/2022    Next Appt:05/25/2022

## 2022-05-25 ENCOUNTER — HOSPITAL ENCOUNTER (OUTPATIENT)
Facility: MEDICAL CENTER | Age: 52
End: 2022-05-25
Attending: NURSE PRACTITIONER
Payer: COMMERCIAL

## 2022-05-25 ENCOUNTER — OFFICE VISIT (OUTPATIENT)
Dept: MEDICAL GROUP | Facility: PHYSICIAN GROUP | Age: 52
End: 2022-05-25
Payer: COMMERCIAL

## 2022-05-25 VITALS
TEMPERATURE: 96.5 F | HEART RATE: 75 BPM | HEIGHT: 66 IN | DIASTOLIC BLOOD PRESSURE: 70 MMHG | WEIGHT: 188 LBS | RESPIRATION RATE: 16 BRPM | BODY MASS INDEX: 30.22 KG/M2 | OXYGEN SATURATION: 99 % | SYSTOLIC BLOOD PRESSURE: 110 MMHG

## 2022-05-25 DIAGNOSIS — Z01.419 WELL WOMAN EXAM: ICD-10-CM

## 2022-05-25 PROCEDURE — 87624 HPV HI-RISK TYP POOLED RSLT: CPT

## 2022-05-25 PROCEDURE — 88175 CYTOPATH C/V AUTO FLUID REDO: CPT

## 2022-05-25 PROCEDURE — 99396 PREV VISIT EST AGE 40-64: CPT | Performed by: NURSE PRACTITIONER

## 2022-05-25 ASSESSMENT — FIBROSIS 4 INDEX: FIB4 SCORE: 0.61

## 2022-05-25 NOTE — PROGRESS NOTES
SUBJECTIVE: 52 y.o. female for annual routine gynecologic exam  Chief Complaint   Patient presents with   • Gynecologic Exam     pap       OB History    Para Term  AB Living   1 1 0 0 0 0   SAB IAB Ectopic Molar Multiple Live Births   0 0 0 0 0 0        Social History     Substance and Sexual Activity   Sexual Activity Yes   • Partners: Male   • Birth control/protection: Surgical, Female Sterilization     Sexual history: currently sexually active, single partner, previous pregnancy   H/O Abnormal Pap no- 25 years ago-  Colposcopy and biopsy were completed. Repeats pap's have been normal.  She  reports that she has been smoking cigarettes. She has a 30.00 pack-year smoking history. She has never used smokeless tobacco.        Allergies: Patient has no known allergies.     ROS:    Last Pap: [id number]  Reports moderate, severe menopause symptoms of hot flashes, night sweats, sleep disruption, mood changes.Reports vaginal dryness.     No significant bloating/fluid retention, pelvic pain, or dyspareunia. No vaginal discharge   No breast tenderness, mass, nipple discharge, changes in size or contour, or abnormal cyclic discomfort.  No urinary tract symptoms, no incontinence, no polydipsia, polyuria,  No abdominal pain, change in bowel habits, black or bloody stools.    No unusual headaches, no visual changes, menstrual migraines   No prolonged cough. No dyspnea or chest pain on exertion.  No depression, labile mood, anxiety, libido changes, insomnia.  No temperature intolerance.  Chronic lesion on outer lip-has gotten mildly harder and smaller over the past year.    Exercise: no regular exercise program    Current medicines (including changes today)  Current Outpatient Medications   Medication Sig Dispense Refill   • levothyroxine (SYNTHROID) 50 MCG Tab Take 1 Tablet by mouth every morning on an empty stomach. 90 Tablet 3   • propranolol (INDERAL) 10 MG Tab Take 1 Tablet by mouth 3 times a day as needed  "(Anxiety). 60 Tablet 0   • fluoxetine (PROZAC) 40 MG capsule Take 1 Capsule by mouth every day. 90 Capsule 3     No current facility-administered medications for this visit.     She  has a past medical history of Allergy, Anxiety, Cancer (HCC), Depression, Migraine, Pneumonia, Thyroid disease, and Tobacco dependence due to cigarettes.    She has no past medical history of Allergy, unspecified not elsewhere classified, Anemia, Arrhythmia, Arthritis, Blood transfusion without reported diagnosis, CHF (congestive heart failure) ([facility]), Clotting disorder (HCC), COPD (chronic obstructive pulmonary disease) (HCC), Diabetes (HCC), GERD (gastroesophageal reflux disease), Glaucoma, Heart attack (HCC), Heart murmur, HIV (human immunodeficiency virus infection) (HCC), Hyperlipidemia, Hypertension, Kidney disease, Pulmonary emphysema (HCC), Seizure (HCC), Stroke (HCC), or Unspecified asthma(493.90).  She  has a past surgical history that includes tonsillectomy and adenoidectomy and tubal coagulation laparoscopic bilateral.     OBJECTIVE:   /70   Pulse 75   Temp 35.8 °C (96.5 °F) (Temporal)   Resp 16   Ht 1.676 m (5' 6\")   Wt 85.3 kg (188 lb)   SpO2 99%   BMI 30.34 kg/m²   Body mass index is 30.34 kg/m².    CHEST:  Clear, good air entry, no wheezes or rales.   HEART:  Regular rate and rhythm.  S1 and S2 normal.   No edema   ABDOMEN:  Soft without tenderness, guarding, mass or organomegaly.    EXTREMITIES:  Extremities, reflexes and peripheral pulses are normal.   SKIN: color normal, vascularity normal, no edema, temperature normal   No rashes or suspicious skin lesions noted.     Pelvic Exam -  [place] spots on bilateral inner lips.  Vaginal Mucosa:  normal vaginal mucosa . Cervix with no visible lesions. No cervical motion tenderness. Uterus is normal sized with no masses. No adnexal tenderness or enlargement appreciated. Thin Prep Pap is obtained, vaginal swab is not obtained and specimen(s) sent to lab  Rectal: no " mass    <ASSESSMENT and PLAN>  1. Well woman exam  - Thinprep Pap with HPV; Future       Encourage patient to quit smoking.  Discussed  breast self exam, mammography screening, feminine hygiene, menopause, osteoporosis, adequate intake of calcium and vitamin D, diet and exercise, Kegel exercises, colorectal cancer screening   Follow-up Gyn exam and 5 years for next Pap.   Next office visit for recheck of chronic medical conditions is due in 6 months

## 2022-05-25 NOTE — PATIENT INSTRUCTIONS
Ashwagandha can help with depression and anxiety symptoms.    [place] spots, or sebaceous glands, are small white or yellow-white bumps inside your vulva. These spots are also found on the lips and cheeks. They normally first appear during puberty, and you tend to get more of them as you age. [place] spots are painless and not harmful.

## 2022-05-26 DIAGNOSIS — Z01.419 WELL WOMAN EXAM: ICD-10-CM

## 2022-05-26 LAB
CYTOLOGY REG CYTOL: NORMAL
HPV HR 12 DNA CVX QL NAA+PROBE: NEGATIVE
HPV16 DNA SPEC QL NAA+PROBE: NEGATIVE
HPV18 DNA SPEC QL NAA+PROBE: NEGATIVE
SPECIMEN SOURCE: NORMAL

## 2022-08-30 ENCOUNTER — OFFICE VISIT (OUTPATIENT)
Dept: MEDICAL GROUP | Facility: PHYSICIAN GROUP | Age: 52
End: 2022-08-30
Payer: COMMERCIAL

## 2022-08-30 VITALS
SYSTOLIC BLOOD PRESSURE: 116 MMHG | OXYGEN SATURATION: 99 % | WEIGHT: 195.8 LBS | RESPIRATION RATE: 18 BRPM | DIASTOLIC BLOOD PRESSURE: 80 MMHG | TEMPERATURE: 97.7 F | HEIGHT: 66 IN | HEART RATE: 71 BPM | BODY MASS INDEX: 31.47 KG/M2

## 2022-08-30 DIAGNOSIS — F41.1 GAD (GENERALIZED ANXIETY DISORDER): ICD-10-CM

## 2022-08-30 DIAGNOSIS — F33.1 MODERATE EPISODE OF RECURRENT MAJOR DEPRESSIVE DISORDER (HCC): ICD-10-CM

## 2022-08-30 DIAGNOSIS — R19.8 IRREGULAR BOWEL HABITS: ICD-10-CM

## 2022-08-30 DIAGNOSIS — R63.5 WEIGHT GAIN: ICD-10-CM

## 2022-08-30 DIAGNOSIS — E03.8 SUBCLINICAL HYPOTHYROIDISM: ICD-10-CM

## 2022-08-30 PROCEDURE — 99214 OFFICE O/P EST MOD 30 MIN: CPT | Performed by: NURSE PRACTITIONER

## 2022-08-30 ASSESSMENT — ANXIETY QUESTIONNAIRES
GAD7 TOTAL SCORE: 16
3. WORRYING TOO MUCH ABOUT DIFFERENT THINGS: NEARLY EVERY DAY
7. FEELING AFRAID AS IF SOMETHING AWFUL MIGHT HAPPEN: MORE THAN HALF THE DAYS
4. TROUBLE RELAXING: MORE THAN HALF THE DAYS
6. BECOMING EASILY ANNOYED OR IRRITABLE: NEARLY EVERY DAY
1. FEELING NERVOUS, ANXIOUS, OR ON EDGE: MORE THAN HALF THE DAYS
2. NOT BEING ABLE TO STOP OR CONTROL WORRYING: NEARLY EVERY DAY
5. BEING SO RESTLESS THAT IT IS HARD TO SIT STILL: SEVERAL DAYS

## 2022-08-30 ASSESSMENT — PATIENT HEALTH QUESTIONNAIRE - PHQ9
CLINICAL INTERPRETATION OF PHQ2 SCORE: 4
SUM OF ALL RESPONSES TO PHQ QUESTIONS 1-9: 16
5. POOR APPETITE OR OVEREATING: 1 - SEVERAL DAYS

## 2022-08-30 ASSESSMENT — FIBROSIS 4 INDEX: FIB4 SCORE: 0.61

## 2022-08-30 NOTE — ASSESSMENT & PLAN NOTE
Chronic and ongoing condition with exacerbation.  Patient did try MiraLAX for approximately 1 to 2 weeks however had an increase in gas pain and cramping.  She did not see any change in her stools.  She does continue to have intermittent abdominal bloating.  She continues to report soft pudding-like stools.  Patient denies any constipation.  Patient did have 1 episode while camping of nausea, vomiting, and diarrhea however that has resolved.    Patient denies any fever, chills, nausea, diarrhea, or constipation symptoms today.

## 2022-08-30 NOTE — ASSESSMENT & PLAN NOTE
Depression Screen (PHQ-2/PHQ-9) 4/7/2022 5/11/2022 8/30/2022   PHQ-2 Total Score - - -   PHQ-2 Total Score 4 5 4   PHQ-9 Total Score - - -   PHQ-9 Total Score 17 20 16       Interpretation of PHQ-9 Total Score   Score Severity   1-4 No Depression   5-9 Mild Depression   10-14 Moderate Depression   15-19 Moderately Severe Depression   20-27 Severe Depression    Chronic condition with exacerbation.  Patient reports that she continues to have depression and anxiety symptoms.  She has been on multiple SSRIs and SNRIs without any resolution.  Most recently on Prozac however she discontinued this on her own.  She reports that she was feeling too emotional while taking Prozac.    She is positive for suicidal ideations however denies suicide plan.    Previous referrals placed for behavioral health however patient has not followed up.

## 2022-08-30 NOTE — ASSESSMENT & PLAN NOTE
"Vitals 5/11/2022 5/25/2022 8/30/2022   WEIGHT 186 188 195.8   HEIGHT 5' 6\" 5' 6\" 5' 6\"   BMI 30.02 kg/m2 30.34 kg/m2 31.6 kg/m2     We discussed patient's weight today.   Patient goals: Ideal weight 160lb.  Diet  Breakfast: Fried eggs x 2 and toast  Stack:None  Lunch:Fruit  Snack:cream of wheat  Dinner: Meat and veggies  Snack: Occ icream  Water: 30-40 oz per day  Other beverages: Coffee- 1/2 cup pot per day, occ smoothy    Exercise: walking 0.9 miles twice a 5 days a week.           "

## 2022-08-30 NOTE — PROGRESS NOTES
"Chief Complaint   Patient presents with    Weight Check     Weight gain, having gi issues with bloating and gas pains. No regular BM in a few months. Unable to eat anything. Miralax isn't working    Medication Problem     Stopped antidepressant- made her too emotional       Subjective:     HPI:   Caro Harding is a 52 y.o. female here to discuss the evaluation and management of:      Weight gain  Vitals 5/11/2022 5/25/2022 8/30/2022   WEIGHT 186 188 195.8   HEIGHT 5' 6\" 5' 6\" 5' 6\"   BMI 30.02 kg/m2 30.34 kg/m2 31.6 kg/m2     We discussed patient's weight today.   Patient goals: Ideal weight 160lb.  Diet  Breakfast: Fried eggs x 2 and toast  Stack:None  Lunch:Fruit  Snack:cream of wheat  Dinner: Meat and veggies  Snack: Occ icream  Water: 30-40 oz per day  Other beverages: Coffee- 1/2 cup pot per day, occ smoothy    Exercise: walking 0.9 miles twice a 5 days a week.             Irregular bowel habits  Chronic and ongoing condition with exacerbation.  Patient did try MiraLAX for approximately 1 to 2 weeks however had an increase in gas pain and cramping.  She did not see any change in her stools.  She does continue to have intermittent abdominal bloating.  She continues to report soft pudding-like stools.  Patient denies any constipation.  Patient did have 1 episode while [facility] of nausea, vomiting, and diarrhea however that has resolved.    Patient denies any fever, chills, nausea, diarrhea, or constipation symptoms today.          VANESSA (generalized anxiety disorder)  VANESSA 7 4/7/2022 5/11/2022 8/30/2022   VANESSA-7 Total Score 13 17 16       Interpretation of VANESSA 7 Total Score   Score Severity:  0-4 No Anxiety   5-9 Mild Anxiety  10-14 Moderate Anxiety  15-21 Severe Anxiety  Current condition with exacerbation.  Patient reports that she has not been taking her Prozac and discontinued it on her own.  She reports that she does not see that it was helping.  Please see additional notes in major depressive " disorder.    Patient denies any panic attacks.    Recurrent major depressive disorder (HCC)  Depression Screen (PHQ-2/PHQ-9) 4/7/2022 5/11/2022 8/30/2022   PHQ-2 Total Score - - -   PHQ-2 Total Score 4 5 4   PHQ-9 Total Score - - -   PHQ-9 Total Score 17 20 16       Interpretation of PHQ-9 Total Score   Score Severity   1-4 No Depression   5-9 Mild Depression   10-14 Moderate Depression   15-19 Moderately Severe Depression   20-27 Severe Depression    Chronic condition with exacerbation.  Patient reports that she continues to have depression and anxiety symptoms.  She has been on multiple SSRIs and SNRIs without any resolution.  Most recently on Prozac however she discontinued this on her own.  She reports that she was feeling too emotional while taking Prozac.    She is positive for suicidal ideations however denies suicide plan.    Previous referrals placed for behavioral health however patient has not followed up.        Subclinical hypothyroidism  Chronic condition.  Unfortunately patient has not repeated her labs since previous visit.  Currently on levothyroxine 50 mcg daily and is tolerating it well.    Patient does have symptoms including fatigue, anxiousness, soft bowels, and weight gain.      ROS  Gen: no fevers/chills, no changes in weight  Pulm: no sob, no cough  CV: no chest pain, no palpitations  GI: no nausea/vomiting, no diarrhea  MSk: no myalgias  Neuro: no headaches, no numbness/tingling      No Known Allergies    Current medicines (including changes today)  Current Outpatient Medications   Medication Sig Dispense Refill    levothyroxine (SYNTHROID) 50 MCG Tab Take 1 Tablet by mouth every morning on an empty stomach. 90 Tablet 3    propranolol (INDERAL) 10 MG Tab Take 1 Tablet by mouth 3 times a day as needed (Anxiety). (Patient not taking: Reported on 8/30/2022) 60 Tablet 0    fluoxetine (PROZAC) 40 MG capsule Take 1 Capsule by mouth every day. (Patient not taking: Reported on 8/30/2022) 90 Capsule  "3     No current facility-administered medications for this visit.          Objective:       /80 (BP Location: Right arm)   Pulse 71   Temp 36.5 °C (97.7 °F) (Temporal)   Resp 18   Ht 1.676 m (5' 6\")   Wt 88.8 kg (195 lb 12.8 oz)   SpO2 99%  Body mass index is 31.6 kg/m².    Physical Exam:  Constitutional: Well-developed and well-nourished female in NAD. Not diaphoretic. No distress.   Skin: warm, dry, intact  Cardiovascular: Regular rate and rhythm without murmur. Radial pulses are intact and equal bilaterally.  Pulmonary: Clear to ausculation. Normal effort. No rales, ronchi, or wheezing.  Abdomen: Soft, non tender, and without distention. Active bowel sounds in all four quadrants.   Neurological: Alert and oriented x 3.   Psychiatric:  Behavior, mood, and affect are appropriate.      Assessment and Plan:     The following treatment plan was discussed:    1. Weight gain  Current condition with exacerbation.  Patient continues to gain weight.  She does report that she is eating a moderately healthy diet.  Discussed the importance of healthy diet choices.  Recommended at minimum 150 minutes of cardiovascular exercise per week and to increase as tolerated to help promote weight loss.  Encouraged adequate water intake to 64 to 80 ounces per day.  Labs ordered as indicated below.  - HEMOGLOBIN A1C; Future  - Lipid Profile; Future  - INSULIN FASTING; Future      2. Irregular bowel habits  Chronic and ongoing condition without any resolution.  Encourage patient to continue with probiotics and fiber supplements.  Recommended her to increase water intake.  Discussed the FODMAP diet.  Referrals placed to gastroenterology today.  - Comp Metabolic Panel; Future  - CBC WITH DIFFERENTIAL; Future  - Referral to Gastroenterology    3. Subclinical hypothyroidism  Current condition, current status unknown as patient is not up-to-date with labs.  Patient will continue on levothyroxine 50 mcg daily.  Patient will obtain " labs and follow-up in 1 to 4 weeks.  - TSH WITH REFLEX TO FT4; Future    4. VANESSA (generalized anxiety disorder)  Chronic condition.  Highly encourage patient to follow-up with referral placed to behavioral health.  Close follow-up in 1 week.    5. Moderate episode of recurrent major depressive disorder (HCC)   Patient has been identified as having a positive depression screening. Appropriate orders and counseling have been given.    Any change or worsening of signs or symptoms, patient encouraged to follow-up or report to urgent care or emergency room for further evaluation. Patient verbalizes understanding and agrees.    Follow-Up: Return in about 4 weeks (around 9/27/2022) for 1 week .      PLEASE NOTE: This dictation was created using voice recognition software. I have made every reasonable attempt to correct obvious errors, but I expect that there are errors of grammar and possibly content that I did not discover before finalizing the note.

## 2022-08-30 NOTE — ASSESSMENT & PLAN NOTE
VANESSA 7 4/7/2022 5/11/2022 8/30/2022   VANESSA-7 Total Score 13 17 16       Interpretation of VANESSA 7 Total Score   Score Severity:  0-4 No Anxiety   5-9 Mild Anxiety  10-14 Moderate Anxiety  15-21 Severe Anxiety  Current condition with exacerbation.  Patient reports that she has not been taking her Prozac and discontinued it on her own.  She reports that she does not see that it was helping.  Please see additional notes in major depressive disorder.    Patient denies any panic attacks.

## 2022-08-30 NOTE — PATIENT INSTRUCTIONS
Behavioral Health     Behavioral Health [address]  [facility] [address]  Phone: [phone]  Fax: [phone]

## 2022-08-30 NOTE — ASSESSMENT & PLAN NOTE
Chronic condition.  Unfortunately patient has not repeated her labs since previous visit.  Currently on levothyroxine 50 mcg daily and is tolerating it well.    Patient does have symptoms including fatigue, anxiousness, soft bowels, and weight gain.

## 2022-09-08 ENCOUNTER — OFFICE VISIT (OUTPATIENT)
Dept: MEDICAL GROUP | Facility: PHYSICIAN GROUP | Age: 52
End: 2022-09-08
Payer: COMMERCIAL

## 2022-09-08 VITALS
DIASTOLIC BLOOD PRESSURE: 70 MMHG | HEIGHT: 66 IN | SYSTOLIC BLOOD PRESSURE: 100 MMHG | BODY MASS INDEX: 31.02 KG/M2 | RESPIRATION RATE: 18 BRPM | TEMPERATURE: 98.4 F | WEIGHT: 193 LBS | OXYGEN SATURATION: 97 % | HEART RATE: 81 BPM

## 2022-09-08 DIAGNOSIS — F17.210 TOBACCO DEPENDENCE DUE TO CIGARETTES: ICD-10-CM

## 2022-09-08 DIAGNOSIS — R19.8 IRREGULAR BOWEL HABITS: ICD-10-CM

## 2022-09-08 DIAGNOSIS — R14.0 ABDOMINAL BLOATING: ICD-10-CM

## 2022-09-08 DIAGNOSIS — M54.41 CHRONIC BILATERAL LOW BACK PAIN WITH RIGHT-SIDED SCIATICA: ICD-10-CM

## 2022-09-08 DIAGNOSIS — F41.1 GAD (GENERALIZED ANXIETY DISORDER): ICD-10-CM

## 2022-09-08 DIAGNOSIS — G89.29 CHRONIC BILATERAL LOW BACK PAIN WITH RIGHT-SIDED SCIATICA: ICD-10-CM

## 2022-09-08 PROCEDURE — 99406 BEHAV CHNG SMOKING 3-10 MIN: CPT | Performed by: NURSE PRACTITIONER

## 2022-09-08 PROCEDURE — 99214 OFFICE O/P EST MOD 30 MIN: CPT | Mod: 25 | Performed by: NURSE PRACTITIONER

## 2022-09-08 RX ORDER — CYCLOBENZAPRINE HCL 5 MG
5 TABLET ORAL 3 TIMES DAILY PRN
Qty: 30 TABLET | Refills: 1 | Status: SHIPPED | OUTPATIENT
Start: 2022-09-08 | End: 2022-09-28

## 2022-09-08 RX ORDER — NAPROXEN 500 MG/1
500 TABLET ORAL 2 TIMES DAILY WITH MEALS
Qty: 60 TABLET | Refills: 1 | Status: SHIPPED | OUTPATIENT
Start: 2022-09-08 | End: 2023-03-07

## 2022-09-08 ASSESSMENT — FIBROSIS 4 INDEX: FIB4 SCORE: 0.61

## 2022-09-08 NOTE — ASSESSMENT & PLAN NOTE
Chronic ongoing health concern.  Patient reports that she is smoking more cigarettes since January and is now currently up to 2 packs/day.  She is aware the risk associated with smoking and would like to quit.  In the past she has tried Wellbutrin, patches, and gums without any relief.

## 2022-09-08 NOTE — PATIENT INSTRUCTIONS
quitline  ([phone])      Gout  Gout is painful swelling that can occur in some of your joints. Gout is a type of arthritis. This condition is caused by having too much uric acid in your body. Uric acid is a chemical that forms when your body breaks down substances called purines. Purines are important for building body proteins.  When your body has too much uric acid, sharp crystals can form and build up inside your joints. This causes pain and swelling. Gout attacks can happen quickly and be very painful (acute gout). Over time, the attacks can affect more joints and become more frequent (chronic gout). Gout can also cause uric acid to build up under your skin and inside your kidneys.  What are the causes?  This condition is caused by too much uric acid in your blood. This can occur because:  · Your kidneys do not remove enough uric acid from your blood. This is the most common cause.  · Your body makes too much uric acid. This can occur with some cancers and cancer treatments. It can also occur if your body is breaking down too many red blood cells (hemolytic anemia).  · You eat too many foods that are high in purines. These foods include organ meats and some seafood. Alcohol, especially beer, is also high in purines.  A gout attack may be triggered by trauma or stress.  What increases the risk?  This condition is more likely to develop in people who:  · Have a family history of gout.  · Are male and middle-aged.  · Are female and have gone through menopause.  · Are obese.  · Frequently drink alcohol, especially beer.  · Are dehydrated.  · Lose weight too quickly.  · Have an organ transplant.  · Have lead poisoning.  · Take certain medicines, including aspirin, cyclosporine, diuretics, levodopa, and niacin.  · Have kidney disease or psoriasis.  What are the signs or symptoms?  An attack of acute gout happens quickly. It usually occurs in just one joint. The most common place is the big toe. Attacks often start at night. Other joints that  may be affected include joints of the feet, ankle, knee, fingers, wrist, or elbow. Symptoms may include:  · Severe pain.  · Warmth.  · Swelling.  · Stiffness.  · Tenderness. The affected joint may be very painful to touch.  · Shiny, red, or purple skin.  · Chills and fever.  Chronic gout may cause symptoms more frequently. More joints may be involved. You may also have white or yellow lumps (tophi) on your hands or feet or in other areas near your joints.  How is this diagnosed?  This condition is diagnosed based on your symptoms, medical history, and physical exam. You may have tests, such as:  · Blood tests to measure uric acid levels.  · Removal of joint fluid with a needle (aspiration) to look for uric acid crystals.  · X-rays to look for joint damage.  How is this treated?  Treatment for this condition has two phases: treating an acute attack and preventing future attacks. Acute gout treatment may include medicines to reduce pain and swelling, including:  · NSAIDs.  · Steroids. These are strong anti-inflammatory medicines that can be taken by mouth (orally) or injected into a joint.  · Colchicine. This medicine relieves pain and swelling when it is taken soon after an attack. It can be given orally or through an IV tube.  Preventive treatment may include:  · Daily use of smaller doses of NSAIDs or colchicine.  · Use of a medicine that reduces uric acid levels in your blood.  · Changes to your diet. You may need to see a specialist about healthy eating (dietitian).  Follow these instructions at home:  During a Gout Attack  · If directed, apply ice to the affected area:  ¨ Put ice in a plastic bag.  ¨ Place a towel between your skin and the bag.  ¨ Leave the ice on for 20 minutes, 2-3 times a day.  · Rest the joint as much as possible. If the affected joint is in your leg, you may be given crutches to use.  · Raise (elevate) the affected joint above the level of your heart as often as possible.  · Drink enough  fluids to keep your urine clear or pale yellow.  · Take over-the-counter and prescription medicines only as told by your health care provider.  · Do not drive or operate heavy machinery while taking prescription pain medicine.  · Follow instructions from your health care provider about eating or drinking restrictions.  · Return to your normal activities as told by your health care provider. Ask your health care provider what activities are safe for you.  Avoiding Future Gout Attacks  · Follow a low-purine diet as told by your dietitian or health care provider. Avoid foods and drinks that are high in purines, including liver, kidney, anchovies, asparagus, herring, mushrooms, mussels, and beer.  · Limit alcohol intake to no more than 1 drink a day for nonpregnant women and 2 drinks a day for men. One drink equals 12 oz of beer, 5 oz of wine, or 1½ oz of hard liquor.  · Maintain a healthy weight or lose weight if you are overweight. If you want to lose weight, talk with your health care provider. It is important that you do not lose weight too quickly.  · Start or maintain an exercise program as told by your health care provider.  · Drink enough fluids to keep your urine clear or pale yellow.  · Take over-the-counter and prescription medicines only as told by your health care provider.  · Keep all follow-up visits as told by your health care provider. This is important.  Contact a health care provider if:  · You have another gout attack.  · You continue to have symptoms of a gout attack after10 days of treatment.  · You have side effects from your medicines.  · You have chills or a fever.  · You have burning pain when you urinate.  · You have pain in your lower back or belly.  Get help right away if:  · You have severe or uncontrolled pain.  · You cannot urinate.  This information is not intended to replace advice given to you by your health care provider. Make sure you discuss any questions you have with your health  care provider.  Document Released: 12/15/2001 Document Revised: 05/25/2017 Document Reviewed: 09/29/2016  [employer] Patient Education © 2017 Wigix [employer] (2017-02-23 

## 2022-09-08 NOTE — ASSESSMENT & PLAN NOTE
Chronic condition.  New referral placed to behavioral health as patient reports that she has called them several times and indicates that the wait list is currently full and continue calling back.  This has been since April and she has not been able to schedule appointment.    Not currently on any antidepressant medications.    Has not tried propanolol.

## 2022-09-08 NOTE — PROGRESS NOTES
No chief complaint on file.      Subjective:     HPI:   Caro Harding is a 52 y.o. female here to discuss the evaluation and management of:      Bilateral low back pain with right-sided sciatica  Chronic condition.  Patient reports approximately 2 months ago she started having bilateral lower back pain in the paraspinal region with occasional numbness running down her right leg.  She has also occasional numbness in left leg but right is greater than left.   Pain level 7/10, burning, constant with aggravation with activity.  Has not taken any OCT meds.    Denies any red flag symptoms including saddle paresthesia, leg weakness, IV drug use, unintentional weight loss, or loss of bowel or bladder control.    Tobacco dependence due to cigarettes  Chronic ongoing health concern.  Patient reports that she is smoking more cigarettes since January and is now currently up to 2 packs/day.  She is aware the risk associated with smoking and would like to quit.  In the past she has tried Wellbutrin, patches, and gums without any relief.    Irregular bowel habits  Chronic and ongoing condition without any relief.  Patient continues to report that she has abdominal bloating, soft bowels, and gas.  She reports this is unchanged for approximately 5 months.  She has had increased stress and anxiety in her life.  She is currently doing the FODMAP diet.  She is previously tried probiotics, MiraLAX, increase fiber, and using over-the-counter Imodium.    She denies any fever, chills, nausea, diarrhea or constipation today.    VANESSA (generalized anxiety disorder)  Chronic condition.  New referral placed to behavioral health as patient reports that she has called them several times and indicates that the wait list is currently full and continue calling back.  This has been since April and she has not been able to schedule appointment.    Not currently on any antidepressant medications.    Has not tried propanolol.      ROS:  Gen: no  "fevers/chills, no changes in weight  Pulm: no sob, no cough  CV: no chest pain, no palpitations  GI: no nausea/vomiting, no diarrhea-positive for soft stools  MSk: Positive per HPI        No Known Allergies    Current medicines (including changes today)  Current Outpatient Medications   Medication Sig Dispense Refill    cyclobenzaprine (FLEXERIL) 5 mg tablet Take 1 Tablet by mouth 3 times a day as needed for Muscle Spasms. 30 Tablet 1    naproxen (NAPROSYN) 500 MG Tab Take 1 Tablet by mouth 2 times a day with meals. 60 Tablet 1    levothyroxine (SYNTHROID) 50 MCG Tab Take 1 Tablet by mouth every morning on an empty stomach. 90 Tablet 3    propranolol (INDERAL) 10 MG Tab Take 1 Tablet by mouth 3 times a day as needed (Anxiety). (Patient not taking: No sig reported) 60 Tablet 0     No current facility-administered medications for this visit.          Objective:       /70 (BP Location: Right arm)   Pulse 81   Temp 36.9 °C (98.4 °F) (Temporal)   Resp 18   Ht 1.676 m (5' 6\")   Wt 87.5 kg (193 lb)   SpO2 97%  Body mass index is 31.15 kg/m².    Physical Exam:  Constitutional: Well-developed and well-nourished female in NAD. Not diaphoretic. No distress.   Skin: warm, dry, intact  Cardiovascular: Regular rate and rhythm without murmur. Radial pulses are intact and equal bilaterally.  Pulmonary: Clear to ausculation. Normal effort. No rales, ronchi, or wheezing.  Back: Full ROM, 5/5 LE strength, sensation intact bilaterally in LE, no TTP over spinous processes, paraspinals positive bilaterally, SI joint positive bilaterally, straight leg raise negative.  Extremities: No cyanosis, clubbing, erythema, nor edema.   Neurological: Alert and oriented x 3.   Psychiatric:  Behavior, mood, and affect are appropriate.      Assessment and Plan:     The following treatment plan was discussed:  I have reviewed labs with patient and answered all questions.    1. Chronic bilateral low back pain with right-sided sciatica  New " condition.  Trial of Flexeril and naproxen were sent to pharmacy.  Discussed with patient the importance of gentle stretching exercises at home.  Recommended cryo and heat therapy as tolerated.  New referral was placed to physical therapy today.  We will continue to monitor.  - cyclobenzaprine (FLEXERIL) 5 mg tablet; Take 1 Tablet by mouth 3 times a day as needed for Muscle Spasms.  Dispense: 30 Tablet; Refill: 1  - Referral to Physical Therapy  - naproxen (NAPROSYN) 500 MG Tab; Take 1 Tablet by mouth 2 times a day with meals.  Dispense: 60 Tablet; Refill: 1    2. Irregular bowel habits  Chronic and ongoing condition that is unchanged.  Continue to encourage patient to do the FODMAP diet and continue on probiotics.  Recommended patient decrease and eventually quit smoking.  Continue with increasing fiber as tolerated.  Discussed relation between soft stools and abdominal bloating with chronic anxiety symptoms.  Did discuss possible referral to gastroenterology if symptoms continue.  KUB x-ray was ordered today.  Labs ordered last visit patient will obtain prior to follow-up visit on September 28.  - [id number] VIEW; Future  - H. PYLORI, UREA BREATH TEST, ADULT; Future    3. Abdominal bloating  See additional notes above.  - H. PYLORI, UREA BREATH TEST, ADULT; Future    4. VANESSA (generalized anxiety disorder)  Chronic ongoing condition.  New referral placed to behavioral health.  Discussed coping exercises.  - Referral to Behavioral Health    5. Tobacco dependence due to cigarettes  Chronic ongoing health concern.  Patient was highly recommended to decrease tobacco use.  Did discuss smoking sensation and counseled patient on risk associated with smoking for 3 minutes during our exam.  Patient was agreeable for referral to lung cancer screening program.  Patient was given 1 800 quit now phone number and handouts to help with smoking cessation.  - REFERRAL TO LUNG CANCER SCREENING PROGRAM; Future      Any change or  worsening of signs or symptoms, patient encouraged to follow-up or report to urgent care or emergency room for further evaluation. Patient verbalizes understanding and agrees.    Follow-Up: Return in about 20 days (around 9/28/2022) for Follow up labs.      PLEASE NOTE: This dictation was created using voice recognition software. I have made every reasonable attempt to correct obvious errors, but I expect that there are errors of grammar and possibly content that I did not discover before finalizing the note.

## 2022-09-08 NOTE — ASSESSMENT & PLAN NOTE
Chronic condition.  Patient reports approximately 2 months ago she started having bilateral lower back pain in the paraspinal region with occasional numbness running down her right leg.  She has also occasional numbness in left leg but right is greater than left.   Pain level 7/10, burning, constant with aggravation with activity.  Has not taken any OCT meds.    Denies any red flag symptoms including saddle paresthesia, leg weakness, IV drug use, unintentional weight loss, or loss of bowel or bladder control.

## 2022-09-08 NOTE — ASSESSMENT & PLAN NOTE
Chronic and ongoing condition without any relief.  Patient continues to report that she has abdominal bloating, soft bowels, and gas.  She reports this is unchanged for approximately 5 months.  She has had increased stress and anxiety in her life.  She is currently doing the FODMAP diet.  She is previously tried probiotics, MiraLAX, increase fiber, and using over-the-counter Imodium.    She denies any fever, chills, nausea, diarrhea or constipation today.

## 2022-09-13 ENCOUNTER — TELEPHONE (OUTPATIENT)
Dept: HEMATOLOGY ONCOLOGY | Facility: MEDICAL CENTER | Age: 52
End: 2022-09-13
Payer: COMMERCIAL

## 2022-09-13 NOTE — TELEPHONE ENCOUNTER
Received referral to lung cancer screening program.  Chart review to assess for lung cancer screening program eligibility.   1. Age 50-80 yrs of age? Yes 52 y.o.  2. 20 pack year hx of smoking, or greater? Yes 1 [id number] hx  3. Current smoker or if quit, has pt quit within last 15 yrs?Yes  Current smoker  4. Any signs or symptoms of lung cancer? None noted  5. Previous history of lung cancer? None noted  6. Chest CT within past 12 mos.? None noted  Patient does meet eligibility criteria. [facility] scheduling notified to schedule the shared decision making visit.

## 2022-09-22 ENCOUNTER — HOSPITAL ENCOUNTER (OUTPATIENT)
Dept: LAB | Facility: MEDICAL CENTER | Age: 52
End: 2022-09-22
Attending: NURSE PRACTITIONER
Payer: COMMERCIAL

## 2022-09-22 DIAGNOSIS — E03.8 SUBCLINICAL HYPOTHYROIDISM: ICD-10-CM

## 2022-09-22 DIAGNOSIS — R14.0 ABDOMINAL BLOATING: ICD-10-CM

## 2022-09-22 DIAGNOSIS — R63.5 WEIGHT GAIN: ICD-10-CM

## 2022-09-22 DIAGNOSIS — R19.8 IRREGULAR BOWEL HABITS: ICD-10-CM

## 2022-09-22 LAB
ALBUMIN SERPL BCP-MCNC: 4.3 G/DL (ref 3.2–4.9)
ALBUMIN/GLOB SERPL: 1.8 G/DL
ALP SERPL-CCNC: 115 U/L (ref 30–99)
ALT SERPL-CCNC: 45 U/L (ref 2–50)
ANION GAP SERPL CALC-SCNC: 10 MMOL/L (ref 7–16)
AST SERPL-CCNC: 28 U/L (ref 12–45)
BASOPHILS # BLD AUTO: 0.7 % (ref 0–1.8)
BASOPHILS # BLD: 0.07 K/UL (ref 0–0.12)
BILIRUB SERPL-MCNC: 0.6 MG/DL (ref 0.1–1.5)
BUN SERPL-MCNC: 17 MG/DL (ref 8–22)
CALCIUM SERPL-MCNC: 9.3 MG/DL (ref 8.5–10.5)
CHLORIDE SERPL-SCNC: 106 MMOL/L (ref 96–112)
CHOLEST SERPL-MCNC: 201 MG/DL (ref 100–199)
CO2 SERPL-SCNC: 26 MMOL/L (ref 20–33)
CREAT SERPL-MCNC: 0.97 MG/DL (ref 0.5–1.4)
EOSINOPHIL # BLD AUTO: 0.48 K/UL (ref 0–0.51)
EOSINOPHIL NFR BLD: 5.1 % (ref 0–6.9)
ERYTHROCYTE [DISTWIDTH] IN BLOOD BY AUTOMATED COUNT: 44.6 FL (ref 35.9–50)
EST. AVERAGE GLUCOSE BLD GHB EST-MCNC: 117 MG/DL
FASTING STATUS PATIENT QL REPORTED: NORMAL
GFR SERPLBLD CREATININE-BSD FMLA CKD-EPI: 71 ML/MIN/1.73 M 2
GLOBULIN SER CALC-MCNC: 2.4 G/DL (ref 1.9–3.5)
GLUCOSE SERPL-MCNC: 95 MG/DL (ref 65–99)
HBA1C MFR BLD: 5.7 % (ref 4–5.6)
HCT VFR BLD AUTO: 46.3 % (ref 37–47)
HDLC SERPL-MCNC: 43 MG/DL
HGB BLD-MCNC: 15.7 G/DL (ref 12–16)
IMM GRANULOCYTES # BLD AUTO: 0.04 K/UL (ref 0–0.11)
IMM GRANULOCYTES NFR BLD AUTO: 0.4 % (ref 0–0.9)
LDLC SERPL CALC-MCNC: 132 MG/DL
LYMPHOCYTES # BLD AUTO: 3.21 K/UL (ref 1–4.8)
LYMPHOCYTES NFR BLD: 34.1 % (ref 22–41)
MCH RBC QN AUTO: 32 PG (ref 27–33)
MCHC RBC AUTO-ENTMCNC: 33.9 G/DL (ref 33.6–35)
MCV RBC AUTO: 94.5 FL (ref 81.4–97.8)
MONOCYTES # BLD AUTO: 0.75 K/UL (ref 0–0.85)
MONOCYTES NFR BLD AUTO: 8 % (ref 0–13.4)
NEUTROPHILS # BLD AUTO: 4.87 K/UL (ref 2–7.15)
NEUTROPHILS NFR BLD: 51.7 % (ref 44–72)
NRBC # BLD AUTO: 0 K/UL
NRBC BLD-RTO: 0 /100 WBC
PLATELET # BLD AUTO: 293 K/UL (ref 164–446)
PMV BLD AUTO: 11.6 FL (ref 9–12.9)
POTASSIUM SERPL-SCNC: 4.4 MMOL/L (ref 3.6–5.5)
PROT SERPL-MCNC: 6.7 G/DL (ref 6–8.2)
RBC # BLD AUTO: 4.9 M/UL (ref 4.2–5.4)
SODIUM SERPL-SCNC: 142 MMOL/L (ref 135–145)
T4 FREE SERPL-MCNC: 0.87 NG/DL (ref 0.93–1.7)
TRIGL SERPL-MCNC: 129 MG/DL (ref 0–149)
TSH SERPL DL<=0.005 MIU/L-ACNC: 9.35 UIU/ML (ref 0.38–5.33)
WBC # BLD AUTO: 9.4 K/UL (ref 4.8–10.8)

## 2022-09-22 PROCEDURE — 80061 LIPID PANEL: CPT

## 2022-09-22 PROCEDURE — 83013 H PYLORI (C-13) BREATH: CPT

## 2022-09-22 PROCEDURE — 84439 ASSAY OF FREE THYROXINE: CPT

## 2022-09-22 PROCEDURE — 36415 COLL VENOUS BLD VENIPUNCTURE: CPT

## 2022-09-22 PROCEDURE — 83036 HEMOGLOBIN GLYCOSYLATED A1C: CPT

## 2022-09-22 PROCEDURE — 85025 COMPLETE CBC W/AUTO DIFF WBC: CPT

## 2022-09-22 PROCEDURE — 84443 ASSAY THYROID STIM HORMONE: CPT

## 2022-09-22 PROCEDURE — 83525 ASSAY OF INSULIN: CPT

## 2022-09-22 PROCEDURE — 80053 COMPREHEN METABOLIC PANEL: CPT

## 2022-09-24 LAB — UREA BREATH TEST QL: NEGATIVE

## 2022-09-25 LAB
FASTING STATUS PATIENT QL REPORTED: NORMAL
INSULIN P FAST SERPL-ACNC: 23 UIU/ML (ref 3–25)

## 2022-09-26 ENCOUNTER — APPOINTMENT (OUTPATIENT)
Dept: RADIOLOGY | Facility: MEDICAL CENTER | Age: 52
End: 2022-09-26
Attending: NURSE PRACTITIONER
Payer: COMMERCIAL

## 2022-09-26 DIAGNOSIS — R19.8 IRREGULAR BOWEL HABITS: ICD-10-CM

## 2022-09-26 PROCEDURE — 74018 RADEX ABDOMEN 1 VIEW: CPT

## 2022-09-28 ENCOUNTER — OFFICE VISIT (OUTPATIENT)
Dept: MEDICAL GROUP | Facility: PHYSICIAN GROUP | Age: 52
End: 2022-09-28
Payer: COMMERCIAL

## 2022-09-28 VITALS
HEART RATE: 88 BPM | DIASTOLIC BLOOD PRESSURE: 74 MMHG | TEMPERATURE: 97.4 F | BODY MASS INDEX: 31.34 KG/M2 | OXYGEN SATURATION: 95 % | RESPIRATION RATE: 18 BRPM | HEIGHT: 66 IN | SYSTOLIC BLOOD PRESSURE: 120 MMHG | WEIGHT: 195 LBS

## 2022-09-28 DIAGNOSIS — R74.8 ELEVATED ALKALINE PHOSPHATASE LEVEL: ICD-10-CM

## 2022-09-28 DIAGNOSIS — R19.8 IRREGULAR BOWEL HABITS: ICD-10-CM

## 2022-09-28 DIAGNOSIS — E78.2 MIXED HYPERLIPIDEMIA: ICD-10-CM

## 2022-09-28 DIAGNOSIS — F17.210 TOBACCO DEPENDENCE DUE TO CIGARETTES: ICD-10-CM

## 2022-09-28 DIAGNOSIS — E03.8 SUBCLINICAL HYPOTHYROIDISM: ICD-10-CM

## 2022-09-28 DIAGNOSIS — Z23 NEED FOR VACCINATION: ICD-10-CM

## 2022-09-28 DIAGNOSIS — F33.1 MODERATE EPISODE OF RECURRENT MAJOR DEPRESSIVE DISORDER (HCC): ICD-10-CM

## 2022-09-28 PROCEDURE — 90677 PCV20 VACCINE IM: CPT | Performed by: NURSE PRACTITIONER

## 2022-09-28 PROCEDURE — 99214 OFFICE O/P EST MOD 30 MIN: CPT | Mod: 25 | Performed by: NURSE PRACTITIONER

## 2022-09-28 PROCEDURE — 90471 IMMUNIZATION ADMIN: CPT | Performed by: NURSE PRACTITIONER

## 2022-09-28 PROCEDURE — 90472 IMMUNIZATION ADMIN EACH ADD: CPT | Performed by: NURSE PRACTITIONER

## 2022-09-28 PROCEDURE — 90746 HEPB VACCINE 3 DOSE ADULT IM: CPT | Performed by: NURSE PRACTITIONER

## 2022-09-28 ASSESSMENT — FIBROSIS 4 INDEX: FIB4 SCORE: 0.71

## 2022-09-28 NOTE — ASSESSMENT & PLAN NOTE
The 10-year ASCVD risk score (Jessica PALACIO Jr., et al., 2013) is: 4.3%    Values used to calculate the score:      Age: 52 years      Sex: Female      Is Non- : No      Diabetic: No      Tobacco smoker: Yes      Systolic Blood Pressure: 120 mmHg      Is BP treated: No      HDL Cholesterol: 43 mg/dL      Total Cholesterol: 201 mg/dL    New condition.  Patient was shown to to have elevated total cholesterol and LDL levels.  She reports she is a moderately healthy diet.  Currently not on statin or omega-3.

## 2022-09-28 NOTE — ASSESSMENT & PLAN NOTE
Chronic health concern. Continues to smoke 1.5 packs per day.   She does continue to want to quit smoking however is difficult to cut down to 2 her stress level.  She reports the lung cancer screening program did reach out to her and she does plan on completing that prior to the end of the year.

## 2022-09-28 NOTE — ASSESSMENT & PLAN NOTE
Chronic ongoing condition without any relief.  Patient continues to have abdominal bloating, soft bowels, and gas.  She denies any fever, chills, nausea, diarrhea or constipation today.    She reports she does take a daily probiotic and is trying to follow the FODMAP diet.  Previously she has used MiraLAX which caused diarrhea without relief of symptoms.    Recent x-ray does show large amount of stool in colon.  Labs indicate elevated TSH level-patient has not been taking her levothyroxine

## 2022-09-28 NOTE — ASSESSMENT & PLAN NOTE
Current condition with improvement.  Patient does continue have mildly elevated alkaline phosphate levels.  She reports that she was told 10 years ago that she thought accumulate around her liver.  She denies any right upper quadrant pain, yellowing of the skin.

## 2022-09-28 NOTE — PROGRESS NOTES
Chief Complaint   Patient presents with    Follow-Up     labs       Subjective:     HPI:   Caro Harding is a 52 y.o. female here to discuss the evaluation and management of:      Mixed hyperlipidemia  The 10-year ASCVD risk score (Jessicaelsie PALACIO Jr., et al., 2013) is: 4.3%    Values used to calculate the score:      Age: 52 years      Sex: Female      Is Non- : No      Diabetic: No      Tobacco smoker: Yes      Systolic Blood Pressure: 120 mmHg      Is BP treated: No      HDL Cholesterol: 43 mg/dL      Total Cholesterol: 201 mg/dL    New condition.  Patient was shown to to have elevated total cholesterol and LDL levels.  She reports she is a moderately healthy diet.  Currently not on statin or omega-3.    Subclinical hypothyroidism  Uncontrolled. Not taking her levothyroxine daily, only taking 2-3 times a week.   Has symptoms of Abd bloating, constipation, fatigue, and depression.     TSH level elevated.    Tobacco dependence due to cigarettes  Chronic health concern. Continues to smoke 1.5 packs per day.   She does continue to want to quit smoking however is difficult to cut down to 2 her stress level.  She reports the lung cancer screening program did reach out to her and she does plan on completing that prior to the end of the year.    Elevated alkaline phosphatase level  Current condition with improvement.  Patient does continue have mildly elevated alkaline phosphate levels.  She reports that she was told 10 years ago that she thought accumulate around her liver.  She denies any right upper quadrant pain, yellowing of the skin.    Irregular bowel habits  Chronic ongoing condition without any relief.  Patient continues to have abdominal bloating, soft bowels, and gas.  She denies any fever, chills, nausea, diarrhea or constipation today.    She reports she does take a daily probiotic and is trying to follow the FODMAP diet.  Previously she has used MiraLAX which caused diarrhea without  "relief of symptoms.    Recent x-ray does show large amount of stool in colon.  Labs indicate elevated TSH level-patient has not been taking her levothyroxine    Recurrent major depressive disorder (HCC)  Chronic condition.  Patient is not currently on any SSRI or SNRIs.  She does continue to follow with a counselor.  Today she denies any suicidal or homicidal ideations.  She indicates she is feeling better as far as her depression symptoms go.      ROS:  Gen: no fevers/chills, no changes in weight  Pulm: no sob, no cough  CV: no chest pain, no palpitations  GI: Per HPI  MSk: no myalgias  Neuro:no numbness/tingling      No Known Allergies    Current medicines (including changes today)  Current Outpatient Medications   Medication Sig Dispense Refill    naproxen (NAPROSYN) 500 MG Tab Take 1 Tablet by mouth 2 times a day with meals. 60 Tablet 1    levothyroxine (SYNTHROID) 50 MCG Tab Take 1 Tablet by mouth every morning on an empty stomach. 90 Tablet 3     No current facility-administered medications for this visit.          Objective:       /74   Pulse 88   Temp 36.3 °C (97.4 °F) (Temporal)   Resp 18   Ht 1.676 m (5' 6\")   Wt 88.5 kg (195 lb)   SpO2 95%  Body mass index is 31.47 kg/m².    Physical Exam:  Constitutional: Well-developed and well-nourished female in NAD. Not diaphoretic. No distress.   Skin: warm, dry, intact  Cardiovascular: Regular rate and rhythm without murmur. Radial pulses are intact and equal bilaterally.  Pulmonary: Clear to ausculation. Normal effort. No rales, ronchi, or wheezing.  Abdomen: Soft, non tender, and without distention. Active bowel sounds in all four quadrants.   Extremities: No cyanosis, clubbing, erythema, nor edema.   Neurological: Alert and oriented x 3.   Psychiatric:  Behavior, mood, and affect are appropriate.    Assessment and Plan:     The following treatment plan was discussed:  I have reviewed labs with patient and answered all questions.    1. Tobacco " dependence due to cigarettes  Current ongoing health concern.  Recommended patient to quit smoking.  Counseled was given about smoking cessation.    2. Subclinical hypothyroidism  Current condition with exacerbation.  Suboptimally controlled.  Encourage patient to start back on levothyroxine 50 mcg daily.  Discussed with her that is most likely causing her abdominal symptoms, chronic fatigue, and some depression.  We will repeat labs in 6 to 8 weeks.  - TSH WITH REFLEX TO FT4; Future    3. Mixed hyperlipidemia  New condition.  Discussed appropriate diet lifestyle modifications.  Recommended omega-3 fatty acids.  We will continue to monitor.    4. Elevated alkaline phosphatase level  Chronic condition.  Most likely due to fatty liver disease.  Recommended weight loss and healthy diet.  Offered ultrasound of liver however patient declined at this time.    5. Irregular bowel habits  Chronic and ongoing condition.  Encourage patient to continue on probiotics and FODMAP diet.  Symptoms may be exacerbated due to uncontrolled hypothyroidism.  We will continue to monitor.    6. Moderate episode of recurrent major depressive disorder (HCC)  Chronic condition.  Denies suicidal ideations.  Continue working on coping skills and following with counselor.    7. Need for vaccination  Patient declined flu vaccine today indicating that historically she has always gotten sick 4 weeks after getting influenza vaccine.  She is aware the risks of not being vaccinated.  - Pneumococcal Conjugate Vaccine 20-Valent (19 yrs+)  - Hepatitis B Vaccine Adult 20+      Any change or worsening of signs or symptoms, patient encouraged to follow-up or report to urgent care or emergency room for further evaluation. Patient verbalizes understanding and agrees.    Follow-Up: Return in about 8 weeks (around 11/23/2022) for TSH levels.      PLEASE NOTE: This dictation was created using voice recognition software. I have made every reasonable attempt to  correct obvious errors, but I expect that there are errors of grammar and possibly content that I did not discover before finalizing the note.

## 2022-09-28 NOTE — ASSESSMENT & PLAN NOTE
Chronic condition.  Patient is not currently on any SSRI or SNRIs.  She does continue to follow with a counselor.  Today she denies any suicidal or homicidal ideations.  She indicates she is feeling better as far as her depression symptoms go.

## 2022-09-28 NOTE — ASSESSMENT & PLAN NOTE
Uncontrolled. Not taking her levothyroxine daily, only taking 2-3 times a week.   Has symptoms of Abd bloating, constipation, fatigue, and depression.     TSH level elevated.

## 2022-09-28 NOTE — PATIENT INSTRUCTIONS
"High Cholesterol    High cholesterol is a condition in which the blood has high levels of a white, waxy, fat-like substance (cholesterol). The human body needs small amounts of cholesterol. The liver makes all the cholesterol that the body needs. Extra (excess) cholesterol comes from the food that we eat.  Cholesterol is carried from the liver by the blood through the blood vessels. If you have high cholesterol, deposits (plaques) may build up on the walls of your blood vessels (arteries). Plaques make the arteries narrower and stiffer. Cholesterol plaques increase your risk for heart attack and stroke. Work with your health care provider to keep your cholesterol levels in a healthy range.  What increases the risk?  This condition is more likely to develop in people who:  Eat foods that are high in animal fat (saturated fat) or cholesterol.  Are overweight.  Are not getting enough exercise.  Have a family history of high cholesterol.  What are the signs or symptoms?  There are no symptoms of this condition.  How is this diagnosed?  This condition may be diagnosed from the results of a blood test.  If you are older than age 20, your health care provider may check your cholesterol every 4-6 years.  You may be checked more often if you already have high cholesterol or other risk factors for heart disease.  The blood test for cholesterol measures:  \"Bad\" cholesterol (LDL cholesterol). This is the main type of cholesterol that causes heart disease. The desired level for LDL is less than 100.  \"Good\" cholesterol (HDL cholesterol). This type helps to protect against heart disease by cleaning the arteries and carrying the LDL away. The desired level for HDL is 60 or higher.  Triglycerides. These are fats that the body can store or burn for energy. The desired number for triglycerides is lower than 150.  Total cholesterol. This is a measure of the total amount of cholesterol in your blood, including LDL cholesterol, HDL " cholesterol, and triglycerides. A healthy number is less than 200.  How is this treated?  This condition is treated with diet changes, lifestyle changes, and medicines.  Diet changes  This may include eating more whole grains, fruits, vegetables, nuts, and fish.  This may also include cutting back on red meat and foods that have a lot of added sugar.  Lifestyle changes  Changes may include getting at least 40 minutes of aerobic exercise 3 times a week. Aerobic exercises include walking, biking, and swimming. Aerobic exercise along with a healthy diet can help you maintain a healthy weight.  Changes may also include quitting smoking.  Medicines  Medicines are usually given if diet and lifestyle changes have failed to reduce your cholesterol to healthy levels.  Your health care provider may prescribe a statin medicine. Statin medicines have been shown to reduce cholesterol, which can reduce the risk of heart disease.  Follow these instructions at home:  Eating and drinking  If told by your health care provider:  Eat chicken (without skin), fish, veal, shellfish, ground turkey breast, and round or loin cuts of red meat.  Do not eat fried foods or fatty meats, such as hot dogs and salami.  Eat plenty of fruits, such as apples.  Eat plenty of vegetables, such as broccoli, potatoes, and carrots.  Eat beans, peas, and lentils.  Eat grains such as barley, rice, couscous, and bulgur wheat.  Eat pasta without cream sauces.  Use skim or nonfat milk, and eat low-fat or nonfat yogurt and cheeses.  Do not eat or drink whole milk, cream, ice cream, egg yolks, or hard cheeses.  Do not eat stick margarine or tub margarines that contain trans fats (also called partially hydrogenated oils).  Do not eat saturated tropical oils, such as coconut oil and palm oil.  Do not eat cakes, cookies, crackers, or other baked goods that contain trans fats.    General instructions  Exercise as directed by your health care provider. Increase your  activity level with activities such as gardening, walking, and taking the stairs.  Take over-the-counter and prescription medicines only as told by your health care provider.  Do not use any products that contain nicotine or tobacco, such as cigarettes and e-cigarettes. If you need help quitting, ask your health care provider.  Keep all follow-up visits as told by your health care provider. This is important.  Contact a health care provider if:  You are struggling to maintain a healthy diet or weight.  You need help to start on an exercise program.  You need help to stop smoking.  Get help right away if:  You have chest pain.  You have trouble breathing.  This information is not intended to replace advice given to you by your health care provider. Make sure you discuss any questions you have with your health care provider.  Document Released: 12/18/2006 Document Revised: 12/21/2018 Document Reviewed: 06/17/2017  [employer] Patient Education © 2020 [employer]

## 2022-12-07 ENCOUNTER — OFFICE VISIT (OUTPATIENT)
Dept: SPORTS MEDICINE | Facility: CLINIC | Age: 52
End: 2022-12-07
Payer: COMMERCIAL

## 2022-12-07 VITALS
HEART RATE: 93 BPM | SYSTOLIC BLOOD PRESSURE: 118 MMHG | HEIGHT: 66 IN | TEMPERATURE: 98.2 F | OXYGEN SATURATION: 95 % | WEIGHT: 195 LBS | RESPIRATION RATE: 18 BRPM | DIASTOLIC BLOOD PRESSURE: 76 MMHG | BODY MASS INDEX: 31.34 KG/M2

## 2022-12-07 DIAGNOSIS — G89.29 CHRONIC RIGHT SHOULDER PAIN: ICD-10-CM

## 2022-12-07 DIAGNOSIS — M25.511 CHRONIC RIGHT SHOULDER PAIN: ICD-10-CM

## 2022-12-07 PROCEDURE — 20610 DRAIN/INJ JOINT/BURSA W/O US: CPT | Mod: RT | Performed by: FAMILY MEDICINE

## 2022-12-07 RX ORDER — TRIAMCINOLONE ACETONIDE 40 MG/ML
40 INJECTION, SUSPENSION INTRA-ARTICULAR; INTRAMUSCULAR ONCE
Status: COMPLETED | OUTPATIENT
Start: 2022-12-07 | End: 2022-12-07

## 2022-12-07 RX ADMIN — TRIAMCINOLONE ACETONIDE 40 MG: 40 INJECTION, SUSPENSION INTRA-ARTICULAR; INTRAMUSCULAR at 10:42

## 2022-12-07 ASSESSMENT — FIBROSIS 4 INDEX: FIB4 SCORE: 0.71

## 2022-12-07 ASSESSMENT — ENCOUNTER SYMPTOMS
FEVER: 0
NECK PAIN: 0

## 2022-12-07 NOTE — PROGRESS NOTES
Subjective:     Caro Harding is a 52 y.o. female who presents for Shoulder Pain (EP/ R shoulder pain )    HPI  Pt presents for reevaluation of right shoulder pain  Patient initially evaluated 2-1/2 years ago for right shoulder pain  She was felt to have a chronic rotator cuff tear and attempted to obtain an MRI at that time  Patient did not obtain MRI at that time due to insurance issues, and patient was lost to follow-up at that time  She has had continued pain since last visit 2-1/2 years ago.  Shoulder has bothered her for over 10 years  Recently, pain is preventing her from sleeping well and she wanted to be reevaluated to discuss options  Pain is a burning type pain   Has difficulties sleeping due to pain   No new falls or injuries     Review of Systems   Constitutional:  Negative for fever.   Musculoskeletal:  Negative for neck pain.   Skin:  Negative for rash.     PMH:  has a past medical history of Allergy, Anxiety, Cancer (HCC), Depression, Migraine, Mixed hyperlipidemia (9/28/2022), Pneumonia, Thyroid disease, and Tobacco dependence due to cigarettes.    She has no past medical history of Allergy, unspecified not elsewhere classified, Anemia, Arrhythmia, Arthritis, Blood transfusion without reported diagnosis, CHF (congestive heart failure) (HCC), Clotting disorder (HCC), COPD (chronic obstructive pulmonary disease) (HCC), Diabetes (HCC), GERD (gastroesophageal reflux disease), Glaucoma, Heart attack (HCC), Heart murmur, HIV (human immunodeficiency virus infection) (HCC), Hypertension, Kidney disease, Pulmonary emphysema (HCC), Seizure (HCC), Stroke (HCC), or Unspecified asthma(493.90).  MEDS:   Current Outpatient Medications:     naproxen (NAPROSYN) 500 MG Tab, Take 1 Tablet by mouth 2 times a day with meals., Disp: 60 Tablet, Rfl: 1    levothyroxine (SYNTHROID) 50 MCG Tab, Take 1 Tablet by mouth every morning on an empty stomach., Disp: 90 Tablet, Rfl: 3    Current Facility-Administered Medications:  "    triamcinolone acetonide (KENALOG-40) injection 40 mg, 40 mg, Injection, Once, Yoshi Barr M.D.  ALLERGIES: No Known Allergies  SURGHX:   Past Surgical History:   Procedure Laterality Date    TONSILLECTOMY AND ADENOIDECTOMY      TUBAL COAGULATION LAPAROSCOPIC BILATERAL       SOCHX:  reports that she has been smoking cigarettes. She has a 35.00 pack-year smoking history. She has never used smokeless tobacco. She reports that she does not currently use drugs after having used the following drugs: Marijuana. She reports that she does not drink alcohol.     Objective:   /76 (BP Location: Left arm, Patient Position: Sitting, BP Cuff Size: Adult)   Pulse 93   Temp 36.8 °C (98.2 °F) (Temporal)   Resp 18   Ht 1.676 m (5' 6\")   Wt 88.5 kg (195 lb)   SpO2 95%   BMI 31.47 kg/m²     Physical Exam  Constitutional:       General: She is not in acute distress.     Appearance: She is well-developed. She is not diaphoretic.   Pulmonary:      Effort: Pulmonary effort is normal.   Neurological:      Mental Status: She is alert.   Right shoulder  General: no gross deformity  ROM: flex 100, extension 50, ER 60, to L1  Palpation: +TTP throughout the shoulder and maximally just inferior to lateral acromion   Strength: 4/5 abduction, 4/5 ER, 4/5 IR  Rotator Cuff: Empty can +, External rotation lag -  Neuro: Sensation equal and intact bilaterally  Pulses: radial, ulnar 2+    Subacromial injection  First, a verbal/signed consent and a verbal time-out were done, explaining the risks and benefits of the procedure. Then the patient was placed in a seated position. The posterior lateral approach was used and landmarks were identified and marked. Skin was cleaned with alcohol, and the clean, no touch technique was used with a 25-gauge 1 1/2 inch needle. First, cold spray was used for surface anesthesia, then 5 milliliters of 1% lidocaine without epinephrine and 1 milliliter of Kenalog 40 mg/mL into the subacromial space " of the right shoulder. Hemostasis was achieved with gentle pressure and a Band-Aid placed over the injection site. The patient tolerated the procedure well without any immediate post injection complications. Post injection instructions for range of motion, ice, activity modification, signs of infection, emergency precautions were discussed with the patient.    Assessment/Plan:   Assessment    1. Chronic right shoulder pain  - Referral to Physical Therapy  - triamcinolone acetonide (KENALOG-40) injection 40 mg    Reviewed treatment options including advanced imaging, surgical referral, home exercise program, physical therapy, injections, medications, and patient prefers to start with physical therapy and corticosteroid injection.  Patient tolerated injection well, as above.  She will start with physical therapy as soon as she is able and will follow-up in this office after a few weeks of PT.

## 2022-12-23 ENCOUNTER — APPOINTMENT (OUTPATIENT)
Dept: URGENT CARE | Facility: PHYSICIAN GROUP | Age: 52
End: 2022-12-23
Payer: COMMERCIAL

## 2023-01-10 ENCOUNTER — OFFICE VISIT (OUTPATIENT)
Dept: BEHAVIORAL HEALTH | Facility: CLINIC | Age: 53
End: 2023-01-10
Payer: COMMERCIAL

## 2023-01-10 DIAGNOSIS — G47.09 OTHER INSOMNIA: ICD-10-CM

## 2023-01-10 DIAGNOSIS — F33.2 SEVERE EPISODE OF RECURRENT MAJOR DEPRESSIVE DISORDER, WITHOUT PSYCHOTIC FEATURES (HCC): ICD-10-CM

## 2023-01-10 DIAGNOSIS — F41.1 GAD (GENERALIZED ANXIETY DISORDER): ICD-10-CM

## 2023-01-10 PROBLEM — F51.04 PSYCHOPHYSIOLOGICAL INSOMNIA: Status: RESOLVED | Noted: 2022-04-07 | Resolved: 2023-01-10

## 2023-01-10 PROBLEM — F33.9 RECURRENT MAJOR DEPRESSIVE DISORDER (HCC): Status: RESOLVED | Noted: 2019-01-07 | Resolved: 2023-01-10

## 2023-01-10 PROCEDURE — 99204 OFFICE O/P NEW MOD 45 MIN: CPT | Performed by: PSYCHIATRY & NEUROLOGY

## 2023-01-10 RX ORDER — DULOXETIN HYDROCHLORIDE 30 MG/1
30 CAPSULE, DELAYED RELEASE ORAL
Qty: 30 CAPSULE | Refills: 1 | Status: SHIPPED | OUTPATIENT
Start: 2023-01-10 | End: 2023-02-14

## 2023-01-10 RX ORDER — DOXEPIN HYDROCHLORIDE 10 MG/1
CAPSULE ORAL
Qty: 60 CAPSULE | Refills: 1 | Status: SHIPPED | OUTPATIENT
Start: 2023-01-10 | End: 2023-02-14

## 2023-01-10 ASSESSMENT — ANXIETY QUESTIONNAIRES
IF YOU CHECKED OFF ANY PROBLEMS ON THIS QUESTIONNAIRE, HOW DIFFICULT HAVE THESE PROBLEMS MADE IT FOR YOU TO DO YOUR WORK, TAKE CARE OF THINGS AT HOME, OR GET ALONG WITH OTHER PEOPLE: EXTREMELY DIFFICULT
GAD7 TOTAL SCORE: 17
3. WORRYING TOO MUCH ABOUT DIFFERENT THINGS: NEARLY EVERY DAY
4. TROUBLE RELAXING: SEVERAL DAYS
6. BECOMING EASILY ANNOYED OR IRRITABLE: NEARLY EVERY DAY
2. NOT BEING ABLE TO STOP OR CONTROL WORRYING: NEARLY EVERY DAY
7. FEELING AFRAID AS IF SOMETHING AWFUL MIGHT HAPPEN: NEARLY EVERY DAY
5. BEING SO RESTLESS THAT IT IS HARD TO SIT STILL: SEVERAL DAYS
1. FEELING NERVOUS, ANXIOUS, OR ON EDGE: NEARLY EVERY DAY

## 2023-01-10 ASSESSMENT — PATIENT HEALTH QUESTIONNAIRE - PHQ9
SUM OF ALL RESPONSES TO PHQ QUESTIONS 1-9: 18
CLINICAL INTERPRETATION OF PHQ2 SCORE: 5
5. POOR APPETITE OR OVEREATING: 1 - SEVERAL DAYS
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
## 2023-02-14 ENCOUNTER — OFFICE VISIT (OUTPATIENT)
Dept: BEHAVIORAL HEALTH | Facility: CLINIC | Age: 53
End: 2023-02-14
Payer: COMMERCIAL

## 2023-02-14 DIAGNOSIS — F41.1 GAD (GENERALIZED ANXIETY DISORDER): ICD-10-CM

## 2023-02-14 DIAGNOSIS — F33.2 SEVERE EPISODE OF RECURRENT MAJOR DEPRESSIVE DISORDER, WITHOUT PSYCHOTIC FEATURES (HCC): ICD-10-CM

## 2023-02-14 DIAGNOSIS — G47.09 OTHER INSOMNIA: ICD-10-CM

## 2023-02-14 PROCEDURE — 99214 OFFICE O/P EST MOD 30 MIN: CPT | Performed by: PSYCHIATRY & NEUROLOGY

## 2023-02-14 RX ORDER — AMITRIPTYLINE HYDROCHLORIDE 25 MG/1
TABLET, FILM COATED ORAL
Qty: 60 TABLET | Refills: 1 | Status: SHIPPED | OUTPATIENT
Start: 2023-02-14 | End: 2023-03-07

## 2023-02-14 RX ORDER — DULOXETIN HYDROCHLORIDE 60 MG/1
60 CAPSULE, DELAYED RELEASE ORAL DAILY
Qty: 30 CAPSULE | Refills: 1 | Status: SHIPPED | OUTPATIENT
Start: 2023-02-14 | End: 2023-03-22 | Stop reason: SDUPTHER
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
## 2023-03-07 ENCOUNTER — OFFICE VISIT (OUTPATIENT)
Dept: BEHAVIORAL HEALTH | Facility: CLINIC | Age: 53
End: 2023-03-07
Payer: COMMERCIAL

## 2023-03-07 DIAGNOSIS — F41.1 GAD (GENERALIZED ANXIETY DISORDER): ICD-10-CM

## 2023-03-07 DIAGNOSIS — F33.2 SEVERE EPISODE OF RECURRENT MAJOR DEPRESSIVE DISORDER, WITHOUT PSYCHOTIC FEATURES (HCC): ICD-10-CM

## 2023-03-07 DIAGNOSIS — G47.09 OTHER INSOMNIA: ICD-10-CM

## 2023-03-07 PROCEDURE — 99214 OFFICE O/P EST MOD 30 MIN: CPT | Performed by: PSYCHIATRY & NEUROLOGY

## 2023-03-07 PROCEDURE — 96127 BRIEF EMOTIONAL/BEHAV ASSMT: CPT | Performed by: PSYCHIATRY & NEUROLOGY

## 2023-03-07 RX ORDER — QUETIAPINE FUMARATE 50 MG/1
TABLET, FILM COATED ORAL
Qty: 60 TABLET | Refills: 1 | Status: SHIPPED | OUTPATIENT
Start: 2023-03-07 | End: 2023-03-22 | Stop reason: SDUPTHER

## 2023-03-07 ASSESSMENT — ANXIETY QUESTIONNAIRES
7. FEELING AFRAID AS IF SOMETHING AWFUL MIGHT HAPPEN: NEARLY EVERY DAY
IF YOU CHECKED OFF ANY PROBLEMS ON THIS QUESTIONNAIRE, HOW DIFFICULT HAVE THESE PROBLEMS MADE IT FOR YOU TO DO YOUR WORK, TAKE CARE OF THINGS AT HOME, OR GET ALONG WITH OTHER PEOPLE: VERY DIFFICULT
5. BEING SO RESTLESS THAT IT IS HARD TO SIT STILL: SEVERAL DAYS
2. NOT BEING ABLE TO STOP OR CONTROL WORRYING: NEARLY EVERY DAY
6. BECOMING EASILY ANNOYED OR IRRITABLE: NEARLY EVERY DAY
3. WORRYING TOO MUCH ABOUT DIFFERENT THINGS: NEARLY EVERY DAY
1. FEELING NERVOUS, ANXIOUS, OR ON EDGE: NEARLY EVERY DAY
4. TROUBLE RELAXING: NEARLY EVERY DAY
GAD7 TOTAL SCORE: 19

## 2023-03-07 ASSESSMENT — PATIENT HEALTH QUESTIONNAIRE - PHQ9
SUM OF ALL RESPONSES TO PHQ QUESTIONS 1-9: 21
5. POOR APPETITE OR OVEREATING: 3 - NEARLY EVERY DAY
CLINICAL INTERPRETATION OF PHQ2 SCORE: 6
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
## 2023-03-13 ENCOUNTER — HOSPITAL ENCOUNTER (OUTPATIENT)
Dept: LAB | Facility: MEDICAL CENTER | Age: 53
End: 2023-03-13
Attending: PSYCHIATRY & NEUROLOGY
Payer: COMMERCIAL

## 2023-03-13 DIAGNOSIS — F33.2 SEVERE EPISODE OF RECURRENT MAJOR DEPRESSIVE DISORDER, WITHOUT PSYCHOTIC FEATURES (HCC): ICD-10-CM

## 2023-03-13 LAB
ALBUMIN SERPL BCP-MCNC: 4.3 G/DL (ref 3.2–4.9)
ALBUMIN/GLOB SERPL: 1.6 G/DL
ALP SERPL-CCNC: 124 U/L (ref 30–99)
ALT SERPL-CCNC: 36 U/L (ref 2–50)
ANION GAP SERPL CALC-SCNC: 11 MMOL/L (ref 7–16)
AST SERPL-CCNC: 21 U/L (ref 12–45)
BILIRUB SERPL-MCNC: 0.4 MG/DL (ref 0.1–1.5)
BUN SERPL-MCNC: 13 MG/DL (ref 8–22)
CALCIUM ALBUM COR SERPL-MCNC: 9.2 MG/DL (ref 8.5–10.5)
CALCIUM SERPL-MCNC: 9.4 MG/DL (ref 8.5–10.5)
CHLORIDE SERPL-SCNC: 104 MMOL/L (ref 96–112)
CO2 SERPL-SCNC: 25 MMOL/L (ref 20–33)
CREAT SERPL-MCNC: 0.92 MG/DL (ref 0.5–1.4)
GFR SERPLBLD CREATININE-BSD FMLA CKD-EPI: 75 ML/MIN/1.73 M 2
GLOBULIN SER CALC-MCNC: 2.7 G/DL (ref 1.9–3.5)
GLUCOSE SERPL-MCNC: 112 MG/DL (ref 65–99)
POTASSIUM SERPL-SCNC: 4 MMOL/L (ref 3.6–5.5)
PROT SERPL-MCNC: 7 G/DL (ref 6–8.2)
SODIUM SERPL-SCNC: 140 MMOL/L (ref 135–145)
T4 FREE SERPL-MCNC: 1.19 NG/DL (ref 0.93–1.7)
TSH SERPL DL<=0.005 MIU/L-ACNC: 7.38 UIU/ML (ref 0.38–5.33)

## 2023-03-13 PROCEDURE — 84439 ASSAY OF FREE THYROXINE: CPT

## 2023-03-13 PROCEDURE — 36415 COLL VENOUS BLD VENIPUNCTURE: CPT

## 2023-03-13 PROCEDURE — 80053 COMPREHEN METABOLIC PANEL: CPT

## 2023-03-13 PROCEDURE — 84443 ASSAY THYROID STIM HORMONE: CPT

## 2023-03-22 ENCOUNTER — OFFICE VISIT (OUTPATIENT)
Dept: BEHAVIORAL HEALTH | Facility: CLINIC | Age: 53
End: 2023-03-22
Payer: COMMERCIAL

## 2023-03-22 DIAGNOSIS — F41.1 GAD (GENERALIZED ANXIETY DISORDER): ICD-10-CM

## 2023-03-22 DIAGNOSIS — G47.09 OTHER INSOMNIA: ICD-10-CM

## 2023-03-22 DIAGNOSIS — F33.41 RECURRENT MAJOR DEPRESSIVE DISORDER, IN PARTIAL REMISSION (HCC): ICD-10-CM

## 2023-03-22 PROCEDURE — 99214 OFFICE O/P EST MOD 30 MIN: CPT | Performed by: PSYCHIATRY & NEUROLOGY

## 2023-03-22 RX ORDER — QUETIAPINE FUMARATE 50 MG/1
TABLET, FILM COATED ORAL
Qty: 60 TABLET | Refills: 1 | Status: SHIPPED | OUTPATIENT
Start: 2023-03-22 | End: 2023-05-24

## 2023-03-22 RX ORDER — DULOXETIN HYDROCHLORIDE 60 MG/1
60 CAPSULE, DELAYED RELEASE ORAL DAILY
Qty: 90 CAPSULE | Refills: 1 | Status: SHIPPED | OUTPATIENT
Start: 2023-03-22 | End: 2023-05-24 | Stop reason: SDUPTHER

## 2023-04-05 ENCOUNTER — HOSPITAL ENCOUNTER (OUTPATIENT)
Dept: RADIOLOGY | Facility: MEDICAL CENTER | Age: 53
End: 2023-04-05
Payer: COMMERCIAL

## 2023-04-05 DIAGNOSIS — R74.8 ACID PHOSPHATASE ELEVATED: ICD-10-CM

## 2023-04-05 PROCEDURE — 76700 US EXAM ABDOM COMPLETE: CPT

## 2023-05-24 ENCOUNTER — OFFICE VISIT (OUTPATIENT)
Dept: BEHAVIORAL HEALTH | Facility: CLINIC | Age: 53
End: 2023-05-24
Payer: COMMERCIAL

## 2023-05-24 DIAGNOSIS — F33.41 RECURRENT MAJOR DEPRESSIVE DISORDER, IN PARTIAL REMISSION (HCC): ICD-10-CM

## 2023-05-24 DIAGNOSIS — F41.1 GAD (GENERALIZED ANXIETY DISORDER): ICD-10-CM

## 2023-05-24 PROCEDURE — 90833 PSYTX W PT W E/M 30 MIN: CPT | Performed by: PSYCHIATRY & NEUROLOGY

## 2023-05-24 PROCEDURE — 99214 OFFICE O/P EST MOD 30 MIN: CPT | Performed by: PSYCHIATRY & NEUROLOGY

## 2023-05-24 RX ORDER — DULOXETIN HYDROCHLORIDE 60 MG/1
60 CAPSULE, DELAYED RELEASE ORAL DAILY
Qty: 90 CAPSULE | Refills: 1 | Status: SHIPPED | OUTPATIENT
Start: 2023-05-24
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
## 2023-09-12 NOTE — PATIENT INSTRUCTIONS
Continue  Care per [facility]    Will have you start levothyroxine 50 mcg daily on empty stomach apart from other daily meds    Thyroid US    Repeat labs in 8 weeks    Follow up with me in 3 months   [facility]:  I discussed with the patient the risks of [facility] including but not limited to erythema, scaling, itching, weeping, crusting, and pain.
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
